# Patient Record
Sex: MALE | URBAN - NONMETROPOLITAN AREA
[De-identification: names, ages, dates, MRNs, and addresses within clinical notes are randomized per-mention and may not be internally consistent; named-entity substitution may affect disease eponyms.]

---

## 2017-08-21 ENCOUNTER — OFFICE VISIT (OUTPATIENT)
Dept: INTERNAL MEDICINE | Age: 32
End: 2017-08-21
Payer: COMMERCIAL

## 2017-08-21 VITALS
HEART RATE: 111 BPM | BODY MASS INDEX: 43.98 KG/M2 | WEIGHT: 224 LBS | RESPIRATION RATE: 9 BRPM | HEIGHT: 60 IN | OXYGEN SATURATION: 98 % | SYSTOLIC BLOOD PRESSURE: 136 MMHG | DIASTOLIC BLOOD PRESSURE: 80 MMHG | TEMPERATURE: 98.4 F

## 2017-08-21 DIAGNOSIS — F32.A DEPRESSION, UNSPECIFIED DEPRESSION TYPE: ICD-10-CM

## 2017-08-21 DIAGNOSIS — E10.319 TYPE 1 DIABETES MELLITUS WITH RETINOPATHY OF BOTH EYES, MACULAR EDEMA PRESENCE UNSPECIFIED, UNSPECIFIED RETINOPATHY SEVERITY (HCC): Chronic | ICD-10-CM

## 2017-08-21 DIAGNOSIS — J01.90 ACUTE NON-RECURRENT SINUSITIS, UNSPECIFIED LOCATION: Primary | ICD-10-CM

## 2017-08-21 PROBLEM — E10.9 DIABETES MELLITUS TYPE 1 (HCC): Chronic | Status: ACTIVE | Noted: 2017-08-21

## 2017-08-21 PROCEDURE — 99213 OFFICE O/P EST LOW 20 MIN: CPT | Performed by: NURSE PRACTITIONER

## 2017-08-21 RX ORDER — SERTRALINE HYDROCHLORIDE 25 MG/1
25 TABLET, FILM COATED ORAL DAILY
Qty: 30 TABLET | Refills: 3 | Status: SHIPPED | OUTPATIENT
Start: 2017-08-21 | End: 2018-07-27

## 2017-08-21 RX ORDER — METHYLPREDNISOLONE 4 MG/1
TABLET ORAL
Qty: 1 KIT | Refills: 0 | Status: SHIPPED | OUTPATIENT
Start: 2017-08-21 | End: 2017-08-27

## 2017-08-21 RX ORDER — AMOXICILLIN AND CLAVULANATE POTASSIUM 875; 125 MG/1; MG/1
1 TABLET, FILM COATED ORAL 2 TIMES DAILY
Qty: 20 TABLET | Refills: 0 | Status: SHIPPED | OUTPATIENT
Start: 2017-08-21 | End: 2017-08-31

## 2017-08-21 ASSESSMENT — ENCOUNTER SYMPTOMS
COLOR CHANGE: 0
BACK PAIN: 0
PHOTOPHOBIA: 0
VOMITING: 0
SINUS PRESSURE: 1
VOICE CHANGE: 0
SHORTNESS OF BREATH: 0
RHINORRHEA: 1
SORE THROAT: 1
NAUSEA: 0
COUGH: 0

## 2017-11-06 NOTE — TELEPHONE ENCOUNTER
Luc Harrison called requesting a refill of the below medication which has been pended for you:     Requested Prescriptions     Pending Prescriptions Disp Refills    insulin detemir (LEVEMIR) 100 UNIT/ML injection vial 1 vial 5     Sig: Inject 35 Units into the skin nightly    insulin NPH (HUMULIN N) 100 UNIT/ML injection vial 1 vial 5     Sig: Inject 15 Units into the skin 2 times daily (before meals)       Last Appointment Date: Visit date not found  Next Appointment Date: 2/22/2018    Allergies   Allergen Reactions    Flagyl [Metronidazole]

## 2017-12-07 DIAGNOSIS — Z00.00 PE (PHYSICAL EXAM), ROUTINE: Primary | ICD-10-CM

## 2018-07-24 ENCOUNTER — TELEPHONE (OUTPATIENT)
Dept: INTERNAL MEDICINE | Age: 33
End: 2018-07-24

## 2018-07-24 DIAGNOSIS — Z00.00 PE (PHYSICAL EXAM), ROUTINE: ICD-10-CM

## 2018-07-24 LAB
ALBUMIN SERPL-MCNC: 4.4 G/DL (ref 3.5–5.2)
ALP BLD-CCNC: 81 U/L (ref 40–130)
ALT SERPL-CCNC: 21 U/L (ref 5–41)
ANION GAP SERPL CALCULATED.3IONS-SCNC: 15 MMOL/L (ref 7–19)
AST SERPL-CCNC: 14 U/L (ref 5–40)
BASOPHILS ABSOLUTE: 0.1 K/UL (ref 0–0.2)
BASOPHILS RELATIVE PERCENT: 0.8 % (ref 0–1)
BILIRUB SERPL-MCNC: <0.2 MG/DL (ref 0.2–1.2)
BUN BLDV-MCNC: 18 MG/DL (ref 6–20)
CALCIUM SERPL-MCNC: 9.6 MG/DL (ref 8.6–10)
CHLORIDE BLD-SCNC: 99 MMOL/L (ref 98–111)
CHOLESTEROL, TOTAL: 181 MG/DL (ref 160–199)
CO2: 26 MMOL/L (ref 22–29)
CREAT SERPL-MCNC: 0.8 MG/DL (ref 0.5–1.2)
CREATININE URINE: 81.9 MG/DL (ref 4.2–622)
EOSINOPHILS ABSOLUTE: 0.2 K/UL (ref 0–0.6)
EOSINOPHILS RELATIVE PERCENT: 3.2 % (ref 0–5)
GFR NON-AFRICAN AMERICAN: >60
GLUCOSE BLD-MCNC: 267 MG/DL (ref 74–109)
HBA1C MFR BLD: 10.3 % (ref 4–6)
HCT VFR BLD CALC: 45.1 % (ref 42–52)
HDLC SERPL-MCNC: 72 MG/DL (ref 55–121)
HEMOGLOBIN: 14.6 G/DL (ref 14–18)
LDL CHOLESTEROL CALCULATED: 86 MG/DL
LYMPHOCYTES ABSOLUTE: 2.8 K/UL (ref 1.1–4.5)
LYMPHOCYTES RELATIVE PERCENT: 42.7 % (ref 20–40)
MCH RBC QN AUTO: 27.4 PG (ref 27–31)
MCHC RBC AUTO-ENTMCNC: 32.4 G/DL (ref 33–37)
MCV RBC AUTO: 84.8 FL (ref 80–94)
MICROALBUMIN UR-MCNC: <1.2 MG/DL (ref 0–19)
MICROALBUMIN/CREAT UR-RTO: NORMAL MG/G
MONOCYTES ABSOLUTE: 0.7 K/UL (ref 0–0.9)
MONOCYTES RELATIVE PERCENT: 10.7 % (ref 0–10)
NEUTROPHILS ABSOLUTE: 2.8 K/UL (ref 1.5–7.5)
NEUTROPHILS RELATIVE PERCENT: 42.3 % (ref 50–65)
PDW BLD-RTO: 12.4 % (ref 11.5–14.5)
PLATELET # BLD: 362 K/UL (ref 130–400)
PMV BLD AUTO: 9.1 FL (ref 9.4–12.4)
POTASSIUM SERPL-SCNC: 4.6 MMOL/L (ref 3.5–5)
RBC # BLD: 5.32 M/UL (ref 4.7–6.1)
SODIUM BLD-SCNC: 140 MMOL/L (ref 136–145)
TOTAL PROTEIN: 7.3 G/DL (ref 6.6–8.7)
TRIGL SERPL-MCNC: 113 MG/DL (ref 0–149)
WBC # BLD: 6.6 K/UL (ref 4.8–10.8)

## 2018-07-24 NOTE — TELEPHONE ENCOUNTER
Patient is requesting refill on his insulin. He has enough for today. Please send to [facility] in [place]. Fior Degroot

## 2018-07-24 NOTE — TELEPHONE ENCOUNTER
Left message to call back, need which insulin and how many units taking and must make appointment to be seen ASAP.

## 2018-07-27 ENCOUNTER — OFFICE VISIT (OUTPATIENT)
Dept: INTERNAL MEDICINE | Age: 33
End: 2018-07-27
Payer: COMMERCIAL

## 2018-07-27 VITALS
BODY MASS INDEX: 35.99 KG/M2 | HEART RATE: 84 BPM | HEIGHT: 69 IN | SYSTOLIC BLOOD PRESSURE: 136 MMHG | DIASTOLIC BLOOD PRESSURE: 78 MMHG | OXYGEN SATURATION: 98 % | WEIGHT: 243 LBS

## 2018-07-27 DIAGNOSIS — E10.319 TYPE 1 DIABETES MELLITUS WITH RETINOPATHY OF BOTH EYES, MACULAR EDEMA PRESENCE UNSPECIFIED, UNSPECIFIED RETINOPATHY SEVERITY (HCC): Primary | Chronic | ICD-10-CM

## 2018-07-27 DIAGNOSIS — L73.8 BACTERIAL FOLLICULITIS: ICD-10-CM

## 2018-07-27 DIAGNOSIS — E78.00 HYPERCHOLESTEREMIA: ICD-10-CM

## 2018-07-27 PROCEDURE — 99215 OFFICE O/P EST HI 40 MIN: CPT | Performed by: INTERNAL MEDICINE

## 2018-07-27 RX ORDER — DOXYCYCLINE 100 MG/1
100 TABLET ORAL 2 TIMES DAILY
Qty: 20 TABLET | Refills: 0 | Status: SHIPPED | OUTPATIENT
Start: 2018-07-27 | End: 2018-08-06

## 2018-07-27 ASSESSMENT — ENCOUNTER SYMPTOMS
VOMITING: 0
BLOOD IN STOOL: 0
SINUS PRESSURE: 0
BACK PAIN: 0
EYE DISCHARGE: 0
SORE THROAT: 0
ABDOMINAL DISTENTION: 0
TROUBLE SWALLOWING: 0
NAUSEA: 0
WHEEZING: 0
EYE ITCHING: 0
SHORTNESS OF BREATH: 0
ABDOMINAL PAIN: 0

## 2018-08-24 ENCOUNTER — TELEPHONE (OUTPATIENT)
Dept: INTERNAL MEDICINE | Age: 33
End: 2018-08-24

## 2018-08-24 RX ORDER — GLUCOSAMINE HCL/CHONDROITIN SU 500-400 MG
CAPSULE ORAL
Qty: 200 STRIP | Refills: 2 | Status: SHIPPED | OUTPATIENT
Start: 2018-08-24

## 2018-10-25 ENCOUNTER — OFFICE VISIT (OUTPATIENT)
Dept: INTERNAL MEDICINE | Age: 33
End: 2018-10-25
Payer: COMMERCIAL

## 2018-10-25 VITALS
HEART RATE: 88 BPM | HEIGHT: 69 IN | SYSTOLIC BLOOD PRESSURE: 124 MMHG | OXYGEN SATURATION: 98 % | DIASTOLIC BLOOD PRESSURE: 80 MMHG | WEIGHT: 256 LBS | TEMPERATURE: 97.9 F | BODY MASS INDEX: 37.92 KG/M2

## 2018-10-25 DIAGNOSIS — L73.2 HIDRADENITIS SUPPURATIVA OF RIGHT AXILLA: Primary | ICD-10-CM

## 2018-10-25 DIAGNOSIS — L02.421 FURUNCLE OF RIGHT AXILLA: ICD-10-CM

## 2018-10-25 PROCEDURE — 99213 OFFICE O/P EST LOW 20 MIN: CPT | Performed by: NURSE PRACTITIONER

## 2018-10-25 PROCEDURE — 10060 I&D ABSCESS SIMPLE/SINGLE: CPT | Performed by: NURSE PRACTITIONER

## 2018-10-25 RX ORDER — CEPHALEXIN 500 MG/1
500 CAPSULE ORAL 3 TIMES DAILY
Qty: 30 CAPSULE | Refills: 0 | Status: SHIPPED | OUTPATIENT
Start: 2018-10-25 | End: 2019-07-18

## 2018-10-25 ASSESSMENT — ENCOUNTER SYMPTOMS
SHORTNESS OF BREATH: 0
RHINORRHEA: 0
VOICE CHANGE: 0
BACK PAIN: 0
COUGH: 0
NAUSEA: 0
PHOTOPHOBIA: 0
COLOR CHANGE: 0
VOMITING: 0

## 2018-10-25 ASSESSMENT — PATIENT HEALTH QUESTIONNAIRE - PHQ9
2. FEELING DOWN, DEPRESSED OR HOPELESS: 0
1. LITTLE INTEREST OR PLEASURE IN DOING THINGS: 0
SUM OF ALL RESPONSES TO PHQ QUESTIONS 1-9: 0
SUM OF ALL RESPONSES TO PHQ QUESTIONS 1-9: 0
SUM OF ALL RESPONSES TO PHQ9 QUESTIONS 1 & 2: 0

## 2018-10-25 NOTE — PROGRESS NOTES
Constitutional: He is oriented to person, place, and time. He appears well-developed and well-nourished. HENT:   Head: Atraumatic. Right Ear: External ear normal.   Left Ear: External ear normal.   Nose: Nose normal.   Mouth/Throat: Oropharynx is clear and moist.   Eyes: Pupils are equal, round, and reactive to light. Conjunctivae are normal.   Neck: Normal range of motion. Neck supple. Cardiovascular: Normal rate, regular rhythm, S1 normal, S2 normal and normal heart sounds. Pulmonary/Chest: Effort normal and breath sounds normal.   Abdominal: Soft. Bowel sounds are normal.   Musculoskeletal: Normal range of motion. Neurological: He is alert and oriented to person, place, and time. Skin: Skin is warm and dry. Right axilla: 3-4 enlarged/raised lesions; able to drain abscess laterally with moderate amount of drainage from area. Dressing applied. Psychiatric: He has a normal mood and affect. His behavior is normal.   Nursing note and vitals reviewed. /80 (Site: Left Upper Arm, Position: Sitting)   Pulse 88   Temp 97.9 °F (36.6 °C)   Ht 5' 9\" (1.753 m)   Wt 256 lb (116.1 kg)   SpO2 98%   BMI 37.80 kg/m²     Assessment:       Diagnosis Orders   1. Hidradenitis suppurativa of right axilla     2. Furuncle of right axilla         Plan:     Area was cleaned and prepped. Using #11 blade the abcess was opened and moderate amount of purulent drainage obtained. Minimal blood loss. Patient tolerated well. Keflex TID for 10 days; keep area clean and dry. Warm compresses to affected area. Call if symptoms worsen or fail to improve. Patient given educational materials -see patient instructions. Discussed use, benefit, and side effects of prescribed medications. All patient questions answered. Pt voiced understanding. Reviewed health maintenance. Instructed to continue currentmedications, diet and exercise. Patient agreed with treatment plan. Follow up as directed.

## 2018-11-05 ENCOUNTER — TELEPHONE (OUTPATIENT)
Dept: INTERNAL MEDICINE | Age: 33
End: 2018-11-05

## 2018-11-06 ENCOUNTER — TELEPHONE (OUTPATIENT)
Dept: INTERNAL MEDICINE | Age: 33
End: 2018-11-06

## 2018-11-06 PROBLEM — J32.9 SINUSITIS: Status: ACTIVE | Noted: 2018-11-06

## 2018-11-06 PROBLEM — R05.9 COUGH: Status: ACTIVE | Noted: 2018-11-06

## 2018-11-06 PROBLEM — F32.A DEPRESSIVE DISORDER: Status: ACTIVE | Noted: 2018-11-06

## 2019-07-18 ENCOUNTER — OFFICE VISIT (OUTPATIENT)
Dept: FAMILY MEDICINE CLINIC | Age: 34
End: 2019-07-18
Payer: COMMERCIAL

## 2019-07-18 VITALS
BODY MASS INDEX: 39.25 KG/M2 | HEIGHT: 69 IN | OXYGEN SATURATION: 99 % | DIASTOLIC BLOOD PRESSURE: 84 MMHG | RESPIRATION RATE: 20 BRPM | HEART RATE: 78 BPM | WEIGHT: 265 LBS | SYSTOLIC BLOOD PRESSURE: 132 MMHG | TEMPERATURE: 98 F

## 2019-07-18 DIAGNOSIS — E10.3593 TYPE 1 DIABETES MELLITUS WITH PROLIFERATIVE RETINOPATHY OF BOTH EYES, MACULAR EDEMA PRESENCE UNSPECIFIED, UNSPECIFIED PROLIFERATIVE RETINOPATHY TYPE (HCC): Primary | Chronic | ICD-10-CM

## 2019-07-18 PROCEDURE — 99203 OFFICE O/P NEW LOW 30 MIN: CPT | Performed by: NURSE PRACTITIONER

## 2019-07-18 ASSESSMENT — ENCOUNTER SYMPTOMS
VOMITING: 0
SINUS PRESSURE: 0
ABDOMINAL PAIN: 0
SHORTNESS OF BREATH: 0
SINUS PAIN: 0
NAUSEA: 0

## 2019-07-18 NOTE — PROGRESS NOTES
DELICA LANCETS FINE MISC ONE LANCET PER TEST UP TO 6 TIMES A DAY DX:E10.31 Yes Mercedez Simmons MD   Insulin Pen Needle 32G X 4 MM MISC USE AS DIRECTED Yes Mercedez Simmons MD   blood glucose monitor strips TESTING 6 TIMES PER DAY DX:E10.31 Yes Mercedez Simmons MD     Allergies   Allergen Reactions    Flagyl [Metronidazole]      Past Surgical History:   Procedure Laterality Date    PILONIDAL CYST EXCISION      TONSILLECTOMY       Family History   Problem Relation Age of Onset    No Known Problems Mother     Colon Cancer Father     Cancer Maternal Grandfather     Heart Attack Maternal Grandfather     Cancer Paternal Grandfather     Heart Attack Paternal Grandfather      Social History     Socioeconomic History    Marital status: Unknown     Spouse name: Not on file    Number of children: Not on file    Years of education: Not on file    Highest education level: Not on file   Occupational History    Not on file   Social Needs    Financial resource strain: Not on file    Food insecurity:     Worry: Not on file     Inability: Not on file    Transportation needs:     Medical: Not on file     Non-medical: Not on file   Tobacco Use    Smoking status: Never Smoker    Smokeless tobacco: Never Used   Substance and Sexual Activity    Alcohol use: No    Drug use: No    Sexual activity: Not on file   Lifestyle    Physical activity:     Days per week: Not on file     Minutes per session: Not on file    Stress: Not on file   Relationships    Social connections:     Talks on phone: Not on file     Gets together: Not on file     Attends [employer] service: Not on file     Active member of club or organization: Not on file     Attends meetings of clubs or organizations: Not on file     Relationship status: Not on file    Intimate partner violence:     Fear of current or ex partner: Not on file     Emotionally abused: Not on file     Physically abused: Not on file     Forced sexual activity: Not on file Creatinine Urine Ratio     TSH without Reflex       PLAN:    Argelia Barnett: New Patient (establish care for diabetes .)      Diagnosis and orders for this visit are above. Return to clinic in 4 months with labs.

## 2019-11-07 ENCOUNTER — TELEPHONE (OUTPATIENT)
Dept: FAMILY MEDICINE CLINIC | Age: 34
End: 2019-11-07

## 2020-05-11 RX ORDER — INSULIN DEGLUDEC 200 U/ML
INJECTION, SOLUTION SUBCUTANEOUS
Qty: 18 ML | Refills: 0 | OUTPATIENT
Start: 2020-05-11

## 2020-05-15 ENCOUNTER — TELEPHONE (OUTPATIENT)
Dept: ADMINISTRATIVE | Age: 35
End: 2020-05-15

## 2020-05-21 ENCOUNTER — OFFICE VISIT (OUTPATIENT)
Dept: FAMILY MEDICINE CLINIC | Age: 35
End: 2020-05-21
Payer: COMMERCIAL

## 2020-05-21 VITALS
SYSTOLIC BLOOD PRESSURE: 118 MMHG | TEMPERATURE: 97.4 F | HEART RATE: 104 BPM | OXYGEN SATURATION: 98 % | HEIGHT: 69 IN | RESPIRATION RATE: 20 BRPM | BODY MASS INDEX: 38.51 KG/M2 | WEIGHT: 260 LBS | DIASTOLIC BLOOD PRESSURE: 72 MMHG

## 2020-05-21 PROCEDURE — 99214 OFFICE O/P EST MOD 30 MIN: CPT | Performed by: FAMILY MEDICINE

## 2020-05-21 RX ORDER — INSULIN DEGLUDEC 200 U/ML
65 INJECTION, SOLUTION SUBCUTANEOUS NIGHTLY
Qty: 6 PEN | Refills: 5 | Status: SHIPPED | OUTPATIENT
Start: 2020-05-21 | End: 2020-10-20 | Stop reason: SDUPTHER

## 2020-05-21 RX ORDER — NAFTIFINE HYDROCHLORIDE 20 MG/G
CREAM TOPICAL
Qty: 1 TUBE | Refills: 2 | Status: SHIPPED | OUTPATIENT
Start: 2020-05-21 | End: 2021-12-20 | Stop reason: ALTCHOICE

## 2020-05-21 RX ORDER — DOXYCYCLINE HYCLATE 100 MG/1
100 CAPSULE ORAL 2 TIMES DAILY
Qty: 20 CAPSULE | Refills: 0 | Status: SHIPPED | OUTPATIENT
Start: 2020-05-21 | End: 2020-05-31

## 2020-05-21 RX ORDER — INSULIN LISPRO 100 [IU]/ML
INJECTION, SOLUTION INTRAVENOUS; SUBCUTANEOUS
Qty: 8 PEN | Refills: 5 | Status: SHIPPED | OUTPATIENT
Start: 2020-05-21 | End: 2020-10-20 | Stop reason: SDUPTHER

## 2020-05-21 ASSESSMENT — ENCOUNTER SYMPTOMS
VOMITING: 0
WHEEZING: 0
SHORTNESS OF BREATH: 0
NAUSEA: 0
ABDOMINAL PAIN: 0
EYE PAIN: 0
RHINORRHEA: 0

## 2020-05-21 NOTE — PROGRESS NOTES
Juany Montez is a 27 y.o. male who presents today for   Chief Complaint   Patient presents with    Medication Refill     Patient presents for medication refills.  Discuss Medications     Patient states that he needs 4 pens of Tresiba per month. Patient also needs pen needles. Chief Complaint: Diabetes    HPI  Patient reports that he has a history of type 1 diabetes since he was 7years old. He states that he has had reasonable control over his diabetes with his blood sugars running mostly in the upper 100s up to about 250. He states that he believes he is has good improvement with use of Wisam Cea and is managing his Humalog via carb counting and adjustments of his blood sugar. He states that this seems to be doing reasonably well for him but he had previously been looking into pursuing a continuous glucose meter but has not had that set up as of yet. He states that years ago he went through the process with a diabetic educator with intensive getting a continuous glucose meter but after 7 visits he was then told that he could not afford the glucose meter and for that reason he had dropped it at that point but discussed with his PCP at his last visit and the plan was to get some labs to see if they get him to qualify but he never followed through with labs but states that he is to the point now where he has to be more diligent and got the labs this morning at [facility] and is hopeful that he may be able to pursue a continuous glucose meter moving forward if he is still uncontrolled. He denies any issues with use of his medications and reports that he is not having any new symptoms. He does have a history of proliferative retinopathy and is following with ophthalmology and receiving treatment.     He does state that he has had a infection or rash that is been occurring on his chest and is been present for some time now and is just not getting any better and is interested in seeing if there is something that may build to improve the area. He states that it will itch at times but otherwise denies any symptoms from the involved area. He does also report that he has a fungal rash in his groin area. He states that he has had this a number times before and previously has been able to get it to calm down with the use of some over-the-counter antifungals or some prescription antifungal creams. He did  some over-the-counter antifungal cream that he may have gotten a little bit of improvement out of but is not resolving the area. Review of Systems   Constitutional: Negative for activity change, appetite change, fever and unexpected weight change. HENT: Negative for congestion and rhinorrhea. Eyes: Negative for pain and visual disturbance. Respiratory: Negative for shortness of breath and wheezing. Cardiovascular: Negative for chest pain and palpitations. Gastrointestinal: Negative for abdominal pain, nausea and vomiting. Genitourinary: Negative for difficulty urinating and frequency. Musculoskeletal: Negative for arthralgias and myalgias. Skin: Positive for rash. Negative for wound. Neurological: Negative for dizziness and headaches. Hematological: Negative for adenopathy. Does not bruise/bleed easily. Psychiatric/Behavioral: Negative for agitation and sleep disturbance. The patient is not nervous/anxious.         Past Medical History:   Diagnosis Date    Diabetes mellitus type 1 ([address]     Diabetic retinopathy [facility]     RIGHT EYE    Diabetic retinopathy ([address]        Current Outpatient Medications   Medication Sig Dispense Refill    Insulin Pen Needle 32G X 4 MM MISC USE AS DIRECTED - pen needles for Tresiba 200 each 5    Insulin Degludec (TRESIBA FLEXTOUCH) 200 UNIT/ML SOPN Inject 65 Units into the skin nightly 6 pen 5    insulin lispro, 1 Unit Dial, (HUMALOG KWIKPEN) 100 UNIT/ML SOPN 1 unit per 6 carbs at meals 8 pen 5    Naftifine HCl 2 % CREA Musculoskeletal: Normal range of motion and neck supple. No muscular tenderness. Trachea: No tracheal deviation. Cardiovascular:      Rate and Rhythm: Normal rate and regular rhythm. Heart sounds: Normal heart sounds. No murmur. No friction rub. No gallop. Pulmonary:      Effort: Pulmonary effort is normal. No respiratory distress. Breath sounds: Normal breath sounds. No wheezing or rales. Abdominal:      General: Bowel sounds are normal. There is no distension. Palpations: Abdomen is soft. Tenderness: There is no abdominal tenderness. Musculoskeletal: Normal range of motion. General: No deformity (No gross deformities of upper or lower extremities) or signs of injury. Right lower leg: No edema. Left lower leg: No edema. Lymphadenopathy:      Cervical: No cervical adenopathy. Skin:     General: Skin is warm and dry. Findings: Rash present. No erythema. Comments: Patient has area on the chest which has the appearance of folliculitis   Neurological:      Mental Status: He is alert and oriented to person, place, and time. Cranial Nerves: No cranial nerve deficit. Psychiatric:         Behavior: Behavior normal.         Thought Content: Thought content normal.         Assessment:       ICD-10-CM    1. Type 1 diabetes mellitus with proliferative retinopathy of both eyes, macular edema presence unspecified, unspecified proliferative retinopathy type ([facility]) B63.8872 Insulin Pen Needle 32G X 4 MM MISC     Insulin Degludec (TRESIBA FLEXTOUCH) 200 UNIT/ML SOPN     insulin lispro, 1 Unit Dial, (HUMALOG KWIKPEN) 100 UNIT/ML SOPN   2. Yeast infection of the skin B37.2 Naftifine HCl 2 % CREA   3. Folliculitis C68.5 doxycycline hyclate (VIBRAMYCIN) 100 MG capsule       Plan:  Discussed diagnoses, expected course, and proper use of medication. Discussed proper care of involved areas and potential side effects from the medication.   Stressed the importance of maintaining his follow-up with a specialist and the importance of being diligent with his diet and diabetes medications. Discussed signs and symptoms requiring medical attention. All questions were answered and patient voiced understanding and agreement with plan as discussed. No orders of the defined types were placed in this encounter. Orders Placed This Encounter   Medications    Insulin Pen Needle 32G X 4 MM MISC     Sig: USE AS DIRECTED - pen needles for Tresiba     Dispense:  200 each     Refill:  5    Insulin Degludec (TRESIBA FLEXTOUCH) 200 UNIT/ML SOPN     Sig: Inject 65 Units into the skin nightly     Dispense:  6 pen     Refill:  5     Please include any needed needles    insulin lispro, 1 Unit Dial, (HUMALOG KWIKPEN) 100 UNIT/ML SOPN     Si unit per 6 carbs at meals     Dispense:  8 pen     Refill:  5     Please include any needed needles.  Naftifine HCl 2 % CREA     Sig: Apply to involved area daily     Dispense:  1 Tube     Refill:  2    doxycycline hyclate (VIBRAMYCIN) 100 MG capsule     Sig: Take 1 capsule by mouth 2 times daily for 10 days     Dispense:  20 capsule     Refill:  0     Medications Discontinued During This Encounter   Medication Reason    Insulin Pen Needle 32G X 4 MM MISC REORDER    Insulin Degludec (TRESIBA FLEXTOUCH) 200 UNIT/ML SOPN REORDER     Patient Instructions   Patient given educational handouts and has had all questions answered. Patient voices understanding and agrees to plans along with risks and benefits of plan. Patient is instructed to continue prior meds,diet, and exercise plans as instructed. Patient agrees to follow up as instructed and sooner if needed. Patient agrees to go to ER if condition becomes emergent. Return if symptoms worsen or fail to improve, for next scheduled follow up with PCP.

## 2020-06-22 ENCOUNTER — NURSE TRIAGE (OUTPATIENT)
Dept: OTHER | Facility: CLINIC | Age: 35
End: 2020-06-22

## 2020-06-24 ENCOUNTER — OFFICE VISIT (OUTPATIENT)
Dept: FAMILY MEDICINE CLINIC | Age: 35
End: 2020-06-24
Payer: COMMERCIAL

## 2020-06-24 VITALS
DIASTOLIC BLOOD PRESSURE: 80 MMHG | SYSTOLIC BLOOD PRESSURE: 122 MMHG | HEIGHT: 69 IN | OXYGEN SATURATION: 97 % | BODY MASS INDEX: 38.66 KG/M2 | TEMPERATURE: 97.1 F | WEIGHT: 261 LBS | HEART RATE: 88 BPM

## 2020-06-24 PROCEDURE — 99213 OFFICE O/P EST LOW 20 MIN: CPT | Performed by: FAMILY MEDICINE

## 2020-06-24 RX ORDER — BLOOD-GLUCOSE TRANSMITTER
EACH MISCELLANEOUS
Qty: 1 EACH | Refills: 5 | Status: SHIPPED | OUTPATIENT
Start: 2020-06-24 | End: 2020-10-20

## 2020-06-24 RX ORDER — BLOOD-GLUCOSE SENSOR
EACH MISCELLANEOUS
Qty: 9 EACH | Refills: 3 | Status: SHIPPED | OUTPATIENT
Start: 2020-06-24 | End: 2020-10-20

## 2020-06-24 RX ORDER — BLOOD-GLUCOSE,RECEIVER,CONT
EACH MISCELLANEOUS
Qty: 1 DEVICE | Refills: 0 | Status: SHIPPED | OUTPATIENT
Start: 2020-06-24 | End: 2020-10-20

## 2020-06-24 ASSESSMENT — ENCOUNTER SYMPTOMS
EYE PAIN: 0
SHORTNESS OF BREATH: 0
NAUSEA: 0
RHINORRHEA: 0
ABDOMINAL PAIN: 0
VOMITING: 0
WHEEZING: 0

## 2020-06-24 NOTE — PROGRESS NOTES
arthralgias and myalgias. Skin: Positive for wound. Negative for rash. Neurological: Negative for dizziness and headaches. Hematological: Negative for adenopathy. Does not bruise/bleed easily. Psychiatric/Behavioral: Negative for agitation and sleep disturbance. The patient is not nervous/anxious. Past Medical History:   Diagnosis Date    Diabetes mellitus type 1 ([address]     Diabetic retinopathy ([address]     RIGHT EYE    Diabetic retinopathy ([address]        Current Outpatient Medications   Medication Sig Dispense Refill    silver sulfADIAZINE (SILVADENE) 1 % cream Apply topically daily. 50 g 1    Continuous Blood Gluc  (DEXCOM G6 ) SAM Use to monitor blood sugar continuously. 1 Device 0    Continuous Blood Gluc Sensor (DEXCOM G6 SENSOR) MISC Use to continuously monitor blood sugar every 10 days. 9 each 3    Continuous Blood Gluc Transmit (DEXCOM G6 TRANSMITTER) MISC You to continuously monitor blood sugar. 1 each 5    Insulin Pen Needle 32G X 4 MM MISC USE AS DIRECTED - pen needles for Tresiba 200 each 5    Insulin Degludec (TRESIBA FLEXTOUCH) 200 UNIT/ML SOPN Inject 65 Units into the skin nightly 6 pen 5    insulin lispro, 1 Unit Dial, (HUMALOG KWIKPEN) 100 UNIT/ML SOPN 1 unit per 6 carbs at meals 8 pen 5    Naftifine HCl 2 % CREA Apply to involved area daily 1 Tube 2    insulin lispro (HUMALOG KWIKPEN) 100 UNIT/ML pen Indications: 1 unit per 6 carbs at meals USE AS DIRECTED 8 pen 5    ONETOUCH DELICA LANCETS FINE MISC ONE LANCET PER TEST UP TO 6 TIMES A DAY DX:E10.31 1 each 2    blood glucose monitor strips TESTING 6 TIMES PER DAY DX:E10.31 200 strip 2     No current facility-administered medications for this visit.            Allergies   Allergen Reactions    Flagyl [Metronidazole]     Other      Pine Sol -  causes rash       Past Surgical History:   Procedure Laterality Date    PILONIDAL CYST EXCISION      TONSILLECTOMY         Social History     Tobacco Use    Lymphadenopathy:      Cervical: No cervical adenopathy. Skin:     General: Skin is warm and dry. Findings: No erythema or rash. Comments: On the inside of patient's right ankle there is an approximately 2 and half to 3 cm x 1 cm area that  resembles more of an abrasion or burn. It appears that with the top layer skin is gone but has started to scab over and healing up without any issues. No surrounding erythema or warmth appreciated. Neurological:      Mental Status: He is alert and oriented to person, place, and time. Cranial Nerves: No cranial nerve deficit. Psychiatric:         Behavior: Behavior normal.         Thought Content: Thought content normal.         Assessment:       ICD-10-CM    1. Skin sore L98.9 silver sulfADIAZINE (SILVADENE) 1 % cream   2. Type 1 diabetes mellitus with hyperglycemia (HCC) E10.65 Continuous Blood Gluc  (DEXCOM G6 ) SAM     Continuous Blood Gluc Sensor (DEXCOM G6 SENSOR) MISC     Continuous Blood Gluc Transmit (DEXCOM G6 TRANSMITTER) MISC       Plan:  Discussed potential causes of the skin sore, expected course, and proper use of medication, including already available home medications and OTC medications. Discussed proper care of involved area. Discussed proper foot care. Discussed signs and symptoms requiring medical attention. At this time will attempt to pursue getting a Dexcom unit for continuous glucose monitoring to attempt to achieve better control of his diabetes. All questions were answered and patient voiced understanding and agreement with plan as discussed. No orders of the defined types were placed in this encounter. Orders Placed This Encounter   Medications    silver sulfADIAZINE (SILVADENE) 1 % cream     Sig: Apply topically daily. Dispense:  50 g     Refill:  1    Continuous Blood Gluc  (DEXCOM G6 ) SAM     Sig: Use to monitor blood sugar continuously.      Dispense:  1 Device     Refill:

## 2020-09-25 ENCOUNTER — TELEPHONE (OUTPATIENT)
Dept: FAMILY MEDICINE CLINIC | Age: 35
End: 2020-09-25

## 2020-09-25 NOTE — TELEPHONE ENCOUNTER
Patient's wife called stating their insurance has changed and he needs a PA for his insulin. Has tried Lantus before and that is what Giovanna Georges says he needs to try.      Told wife I will let her know when I hear from the insurance company

## 2020-10-20 ENCOUNTER — OFFICE VISIT (OUTPATIENT)
Dept: FAMILY MEDICINE CLINIC | Age: 35
End: 2020-10-20
Payer: COMMERCIAL

## 2020-10-20 VITALS
TEMPERATURE: 97.3 F | WEIGHT: 259 LBS | SYSTOLIC BLOOD PRESSURE: 138 MMHG | DIASTOLIC BLOOD PRESSURE: 88 MMHG | HEART RATE: 81 BPM | RESPIRATION RATE: 20 BRPM | BODY MASS INDEX: 38.25 KG/M2 | OXYGEN SATURATION: 98 %

## 2020-10-20 PROCEDURE — 99214 OFFICE O/P EST MOD 30 MIN: CPT | Performed by: NURSE PRACTITIONER

## 2020-10-20 RX ORDER — INSULIN DEGLUDEC 200 U/ML
65 INJECTION, SOLUTION SUBCUTANEOUS NIGHTLY
Qty: 6 PEN | Refills: 5 | Status: SHIPPED | OUTPATIENT
Start: 2020-10-20 | End: 2021-12-20 | Stop reason: SDUPTHER

## 2020-10-20 RX ORDER — INSULIN LISPRO 100 [IU]/ML
INJECTION, SOLUTION INTRAVENOUS; SUBCUTANEOUS
Qty: 8 PEN | Refills: 5 | Status: SHIPPED | OUTPATIENT
Start: 2020-10-20 | End: 2021-12-20 | Stop reason: SDUPTHER

## 2020-10-20 RX ORDER — MINOCYCLINE HYDROCHLORIDE 100 MG/1
100 TABLET ORAL 2 TIMES DAILY
Qty: 60 TABLET | Refills: 0 | Status: SHIPPED | OUTPATIENT
Start: 2020-10-20 | End: 2020-11-19

## 2020-10-20 RX ORDER — INSULIN LISPRO 100 [IU]/ML
INJECTION, SOLUTION INTRAVENOUS; SUBCUTANEOUS
Qty: 8 PEN | Refills: 5 | Status: SHIPPED | OUTPATIENT
Start: 2020-10-20 | End: 2020-10-20 | Stop reason: SDUPTHER

## 2020-10-20 ASSESSMENT — ENCOUNTER SYMPTOMS
DIARRHEA: 0
TROUBLE SWALLOWING: 0
ABDOMINAL PAIN: 0
NAUSEA: 0
CONSTIPATION: 0
COUGH: 0
SHORTNESS OF BREATH: 0
VOMITING: 0

## 2020-10-20 NOTE — PROGRESS NOTES
retinopathy ([address]     RIGHT EYE    Diabetic retinopathy ([address]       Prior to Visit Medications    Medication Sig Taking? Authorizing Provider   Continuous Blood Gluc  (FREESTYLE OMI 2 READER SYSTM) SAM 1 Device by Does not apply route daily Yes SADIE Avila   Continuous Blood Gluc Sensor (FREESTYLE OMI 2 SENSOR SYSTM) MISC 1 patch by Does not apply route daily Yes SADIE Avila   Insulin Pen Needle 32G X 4 MM MISC USE AS DIRECTED - pen needles for Tresiba Yes SADIE Avila   Insulin Degludec (TRESIBA FLEXTOUCH) 200 UNIT/ML SOPN Inject 65 Units into the skin nightly Yes SADIE Avila   insulin lispro, 1 Unit Dial, (HUMALOG KWIKPEN) 100 UNIT/ML SOPN 1 unit per 6 carbs at meals Yes SADIE Avila   minocycline (DYNACIN) 100 MG tablet Take 1 tablet by mouth 2 times daily Yes SADIE Avila   Naftifine HCl 2 % CREA Apply to involved area daily Yes Viji Gillespie MD   insulin lispro (HUMALOG KWIKPEN) 100 UNIT/ML pen Indications: 1 unit per 6 carbs at meals USE AS DIRECTED Yes SADIE Avila   ONETOUCH DELICA LANCETS FINE MISC ONE LANCET PER TEST UP TO 6 TIMES A DAY DX:E10.31 Yes Cassie Marquez MD   blood glucose monitor strips TESTING 6 TIMES PER DAY DX:E10.31 Yes Cassie Marquez MD     Allergies   Allergen Reactions    Flagyl [Metronidazole]     Other      Pine Sol -  causes rash       Review of Systems   Constitutional: Negative for fatigue, fever and unexpected weight change. HENT: Negative for hearing loss and trouble swallowing. Eyes: Negative for visual disturbance. Respiratory: Negative for cough and shortness of breath. Cardiovascular: Negative for chest pain and leg swelling. Gastrointestinal: Negative for abdominal pain, constipation, diarrhea, nausea and vomiting. Genitourinary: Negative for difficulty urinating and dysuria. Musculoskeletal: Positive for arthralgias (shoulder). Negative for myalgias. Neurological: Negative for dizziness and headaches. Psychiatric/Behavioral: Positive for sleep disturbance (night and days mixed up). Negative for dysphoric mood. OBJECTIVE:    Physical Exam  Vitals signs reviewed. Constitutional:       Appearance: Normal appearance. He is well-developed. HENT:      Head: Normocephalic and atraumatic. Right Ear: Tympanic membrane, ear canal and external ear normal. There is no impacted cerumen. Left Ear: Tympanic membrane, ear canal and external ear normal. There is no impacted cerumen. Nose: Nose normal.      Mouth/Throat:      Lips: Pink. Mouth: Mucous membranes are moist.      Dentition: Normal dentition. Tongue: No lesions. Pharynx: Oropharynx is clear. Uvula midline. Tonsils: No tonsillar exudate or tonsillar abscesses. Eyes:      General: Lids are normal.         Right eye: No discharge. Left eye: No discharge. Extraocular Movements:      Right eye: Normal extraocular motion. Left eye: Normal extraocular motion. Conjunctiva/sclera: Conjunctivae normal.      Right eye: Right conjunctiva is not injected. Left eye: Left conjunctiva is not injected. Pupils: Pupils are equal, round, and reactive to light. Neck:      Musculoskeletal: Normal range of motion and neck supple. Thyroid: No thyromegaly. Vascular: No carotid bruit or JVD. Cardiovascular:      Rate and Rhythm: Normal rate and regular rhythm. Pulses:           Carotid pulses are 2+ on the right side and 2+ on the left side. Radial pulses are 2+ on the right side and 2+ on the left side. Heart sounds: Normal heart sounds, S1 normal and S2 normal. No murmur. Pulmonary:      Effort: Pulmonary effort is normal. No accessory muscle usage. Breath sounds: Normal breath sounds. Abdominal:      General: Bowel sounds are normal. There is no distension or abdominal bruit. Palpations: Abdomen is soft.  There is no mass. Tenderness: There is no abdominal tenderness. Hernia: No hernia is present. Musculoskeletal: Normal range of motion. Right lower leg: No edema. Left lower leg: No edema. Lymphadenopathy:      Cervical:      Right cervical: No superficial cervical adenopathy. Left cervical: No superficial cervical adenopathy. Skin:     General: Skin is warm and dry. Coloration: Skin is not jaundiced or pale. Findings: Erythema and rash present. No lesion. Rash is crusting and papular. Nails: There is no clubbing. Neurological:      Mental Status: He is alert and oriented to person, place, and time. Cranial Nerves: No facial asymmetry. Motor: No weakness or tremor. Coordination: Coordination normal.      Gait: Gait normal.      Deep Tendon Reflexes: Reflexes are normal and symmetric. Psychiatric:         Attention and Perception: Attention normal.         Mood and Affect: Mood normal.         Speech: Speech normal.         Behavior: Behavior normal.         Thought Content: Thought content normal.         Cognition and Memory: Memory normal.         Judgment: Judgment normal.        /88 (Site: Left Upper Arm, Position: Sitting, Cuff Size: Medium Adult)   Pulse 81   Temp 97.3 °F (36.3 °C) (Temporal)   Resp 20   Wt 259 lb (117.5 kg)   SpO2 98%   BMI 38.25 kg/m²      ASSESSMENT:      ICD-10-CM    1.  Type 1 diabetes mellitus with proliferative retinopathy of both eyes, macular edema presence unspecified, unspecified proliferative retinopathy type (HCC)  E05.5179 Continuous Blood Gluc  (FREESTYLE OMI 2 READER SYSTM) SAM     Continuous Blood Gluc Sensor (FREESTYLE OMI 2 SENSOR SYSTM) MISC     Insulin Pen Needle 32G X 4 MM MISC     Insulin Degludec (TRESIBA FLEXTOUCH) 200 UNIT/ML SOPN     insulin lispro, 1 Unit Dial, (HUMALOG KWIKPEN) 100 UNIT/ML SOPN     CBC Auto Differential     Comprehensive Metabolic Panel     Hemoglobin A1C Microalbumin / Creatinine Urine Ratio     T4, Free     TSH without Reflex   2. Hypercholesteremia  E78.00 Lipid Panel     T4, Free     TSH without Reflex   3. Hypothyroidism, unspecified type  E03.9    4. Skin infection  L08.9 minocycline (DYNACIN) 100 MG tablet       PLAN:    Samuel Barnett: Diabetes (check up for diabetic . needs labs )    Review labs  I think compliance would be better if we are able to achieve the continuous monitor and he can adjust his insulin according to how high his blood sugars hopefully the Yajaira Rebolledo will not be that expensive and we can adjust accordingly  Diagnosis and orders for thisvisit are above. All patient questions answered. Pt voiced understanding. Reviewedhealth maintenance. Instructed to continue current medications, diet and exercise. Patient agreed with treatment plan. Follow up as directed.

## 2021-01-21 ENCOUNTER — OFFICE VISIT (OUTPATIENT)
Dept: FAMILY MEDICINE CLINIC | Age: 36
End: 2021-01-21
Payer: COMMERCIAL

## 2021-01-21 VITALS
HEART RATE: 85 BPM | TEMPERATURE: 96.6 F | BODY MASS INDEX: 39.25 KG/M2 | OXYGEN SATURATION: 99 % | HEIGHT: 69 IN | WEIGHT: 265 LBS | DIASTOLIC BLOOD PRESSURE: 80 MMHG | SYSTOLIC BLOOD PRESSURE: 130 MMHG

## 2021-01-21 DIAGNOSIS — B37.2 YEAST INFECTION OF THE SKIN: Primary | ICD-10-CM

## 2021-01-21 PROCEDURE — 99213 OFFICE O/P EST LOW 20 MIN: CPT | Performed by: FAMILY MEDICINE

## 2021-01-21 RX ORDER — KETOCONAZOLE 20 MG/G
CREAM TOPICAL
Qty: 30 G | Refills: 1 | Status: SHIPPED | OUTPATIENT
Start: 2021-01-21 | End: 2021-12-20 | Stop reason: ALTCHOICE

## 2021-01-21 RX ORDER — FLUCONAZOLE 200 MG/1
200 TABLET ORAL
Qty: 2 TABLET | Refills: 0 | Status: SHIPPED | OUTPATIENT
Start: 2021-01-21 | End: 2021-01-29

## 2021-01-21 NOTE — PROGRESS NOTES
[address] [facility]  [address] [address]  Dept: [phone]  Dept Fax: [phone]: [phone]     Visit type: Established patient    Reason for Visit: Rash (Rash on his chest, had rash for a couple of months )      Assessment and Plan       1. Yeast infection of the skin  -     ketoconazole (NIZORAL) 2 % cream; Apply topically daily. , Disp-30 g, R-1, Normal  -     fluconazole (DIFLUCAN) 200 MG tablet; Take 1 tablet by mouth every 7 days for 2 doses, Disp-2 tablet, R-0Normal    Discussed diagnosis, expected course, and proper use of medication. Discussed proper care of involved area. Discussed signs and symptoms requiring medical attention. All questions were answered and patient voiced understanding and agreement with plan as discussed. Return if symptoms worsen or fail to improve, for next scheduled follow up with PCP. Subjective       HPI   Patient reports he has a rash on his chest and was previously seen with concerns of the acne but the antibiotics prescribed did not provide him any benefit to his rash. He states that all started on 3 months ago and has gotten worse and will itch more at certain times. He does state that sweating makes it seem to itch more. He denies any contributing factors that he is aware of he cannot think of any changes that contributed to the involved area. He has been using Dial soap in efforts of keeping the area clean and when he did get improvement with antibiotics prescribed he ended up using an over-the-counter antifungal cream which seemed to provide him some benefit but was not able to resolve the involved area. He denies any other aggravating or alleviating factors for his rash. Review of Systems   Constitutional: Negative for activity change, appetite change, fatigue and fever. HENT: Negative for congestion, rhinorrhea and sore throat. Eyes: Negative for pain and discharge.    Respiratory: Negative for cough and shortness of breath. Cardiovascular: Negative for chest pain and palpitations. Gastrointestinal: Negative for abdominal pain, constipation, diarrhea, nausea and vomiting. Endocrine: Negative for cold intolerance and heat intolerance. Genitourinary: Negative for dysuria and hematuria. Musculoskeletal: Negative for back pain, gait problem, myalgias and neck pain. Skin: Positive for rash. Negative for wound. Allergies   Allergen Reactions    Flagyl [Metronidazole]     Other      Pine Sol -  causes rash       Outpatient Medications Prior to Visit   Medication Sig Dispense Refill    Continuous Blood Gluc  (FREESTYLE OMI 2 READER SYSTM) SAM USE AS DIRECTED 1 Device 0    Continuous Blood Gluc Sensor (FREESTYLE OMI 2 SENSOR SYSTM) MISC 1 patch by Does not apply route daily 30 each 0    Insulin Pen Needle 32G X 4 MM MISC USE AS DIRECTED - pen needles for Tresiba 200 each 5    Insulin Degludec (TRESIBA FLEXTOUCH) 200 UNIT/ML SOPN Inject 65 Units into the skin nightly 6 pen 5    insulin lispro, 1 Unit Dial, (HUMALOG KWIKPEN) 100 UNIT/ML SOPN 1 unit per 6 carbs at meals pt states normally takes 20 units at each meal. 8 pen 5    Naftifine HCl 2 % CREA Apply to involved area daily 1 Tube 2    insulin lispro (HUMALOG KWIKPEN) 100 UNIT/ML pen Indications: 1 unit per 6 carbs at meals USE AS DIRECTED 8 pen 5    ONETOUCH DELICA LANCETS FINE MISC ONE LANCET PER TEST UP TO 6 TIMES A DAY DX:E10.31 1 each 2    blood glucose monitor strips TESTING 6 TIMES PER DAY DX:E10.31 200 strip 2     No facility-administered medications prior to visit.          Past Medical History:   Diagnosis Date    Diabetes mellitus type 1 ([address]     Diabetic retinopathy ([address]     RIGHT EYE    Diabetic retinopathy ([address]         Social History     Tobacco Use    Smoking status: Never Smoker    Smokeless tobacco: Never Used   Substance Use Topics    Alcohol use: No        Past Surgical General: Skin is warm and dry. Findings: Rash present. No erythema. Comments: Rash on face and chest that in some areas appear to be associated with hair follicles however he does have some areas that are consistent with fungal rash. Neurological:      Mental Status: He is alert and oriented to person, place, and time. Cranial Nerves: No cranial nerve deficit. Psychiatric:         Mood and Affect: Mood normal.         Behavior: Behavior normal.         Thought Content:  Thought content normal.           Data Reviewed and Summarized       Labs:     Imaging/Testing:          Shama Maldonado MD

## 2021-02-20 ASSESSMENT — ENCOUNTER SYMPTOMS
NAUSEA: 0
EYE PAIN: 0
CONSTIPATION: 0
ABDOMINAL PAIN: 0
SORE THROAT: 0
SHORTNESS OF BREATH: 0
EYE DISCHARGE: 0
BACK PAIN: 0
VOMITING: 0
RHINORRHEA: 0
COUGH: 0
DIARRHEA: 0

## 2021-02-21 NOTE — PATIENT INSTRUCTIONS
Patient Education        Yeast Skin Infection: Care Instructions  Your Care Instructions     Yeast normally lives on your skin. Sometimes too much yeast can overgrow in certain areas of the skin and cause an infection. The infection causes red, scaly, moist patches on your skin that may itch. Common areas for skin yeast infections are skin folds under the breasts or belly area. The warm and moist areas in the skin folds can make it easier for yeast to overgrow. Yeast infections also can be found on other parts of the body such as the groin or armpits. You will probably get a cream or ointment that contains an antifungal medicine. Examples of these are miconazole and clotrimazole. You put it on your skin to treat the infection. Your doctor may give you a prescription for the cream or ointment. Or you may be able to buy it without a prescription at most drugstores. If the infection is severe, the doctor will prescribe antifungal pills. A yeast infection usually goes away after about a week of treatment. But it's important to use the medicine for as long as your doctor tells you to. Follow-up care is a key part of your treatment and safety. Be sure to make and go to all appointments, and call your doctor if you are having problems. It's also a good idea to know your test results and keep a list of the medicines you take. How can you care for yourself at home? · Be safe with medicines. Take your medicines exactly as prescribed. Call your doctor if you think you are having a problem with your medicine. · Keep your skin clean and dry. Your doctor may suggest using powder that contains an antifungal medicine in the skin folds. · Wear loose clothing. When should you call for help? Call your doctor now or seek immediate medical care if:    · You have symptoms of infection, such as:  ? Increased pain, swelling, warmth, or redness. ? Red streaks leading from the area. ? Pus draining from the area. ? A fever. Watch closely for changes in your health, and be sure to contact your doctor if:    · You do not get better as expected. Where can you learn more? Go to [email or web address]://chpepiceweb.[email or web address] [email or web address] org and sign in to your [employer] account. Enter I317 in the Twelve box to learn more about \"Yeast Skin Infection: Care Instructions. \"     If you do not have an account, please click on the \"Sign Up Now\" link. Current as of: July 2, 2020               Content Version: 12.6  © 9818-3884 [employer]. Care instructions adapted under license by [facility] ([facility]). If you have questions about a medical condition or this instruction, always ask your healthcare professional. [address] any warranty or liability for your use of this information.

## 2021-05-12 ENCOUNTER — OFFICE VISIT (OUTPATIENT)
Dept: FAMILY MEDICINE CLINIC | Age: 36
End: 2021-05-12
Payer: COMMERCIAL

## 2021-05-12 VITALS
RESPIRATION RATE: 20 BRPM | TEMPERATURE: 97.2 F | HEART RATE: 90 BPM | HEIGHT: 69 IN | WEIGHT: 266 LBS | BODY MASS INDEX: 39.4 KG/M2 | SYSTOLIC BLOOD PRESSURE: 106 MMHG | DIASTOLIC BLOOD PRESSURE: 82 MMHG | OXYGEN SATURATION: 98 %

## 2021-05-12 DIAGNOSIS — B35.4 TINEA CORPORIS: Primary | ICD-10-CM

## 2021-05-12 DIAGNOSIS — L30.9 DERMATITIS: ICD-10-CM

## 2021-05-12 DIAGNOSIS — H91.93 DECREASED HEARING OF BOTH EARS: ICD-10-CM

## 2021-05-12 PROCEDURE — 99213 OFFICE O/P EST LOW 20 MIN: CPT | Performed by: FAMILY MEDICINE

## 2021-05-12 RX ORDER — FLUCONAZOLE 200 MG/1
200 TABLET ORAL
Qty: 2 TABLET | Refills: 0 | Status: SHIPPED | OUTPATIENT
Start: 2021-05-12 | End: 2021-05-20

## 2021-05-12 RX ORDER — TRIAMCINOLONE ACETONIDE 1 MG/G
CREAM TOPICAL
Qty: 1 TUBE | Refills: 0 | Status: SHIPPED | OUTPATIENT
Start: 2021-05-12 | End: 2021-12-20 | Stop reason: ALTCHOICE

## 2021-05-12 NOTE — PROGRESS NOTES
[facility]  [place]  [address] [address]  Dept: [phone]  Dept Fax: [phone]: [phone]     Visit type: Established patient    Reason for Visit: Rash (left leg ) and Cerumen Impaction (pt wants ears looked at)      Assessment and Plan       1. Tinea corporis  -     fluconazole (DIFLUCAN) 200 MG tablet; Take 1 tablet by mouth every 7 days for 2 doses, Disp-2 tablet, R-0Normal  2. Dermatitis  -     triamcinolone (KENALOG) 0.1 % cream; Apply topically 2 times daily. , Disp-1 Tube, R-0, Normal  3. Decreased hearing of both ears    Discussed diagnosis, expected course, and proper use of medication, including OTC medications if prescription is too expensive or insurance does not cover. Discussed the lack of significant cerumen in his ears bilaterally and offered the possibility of having a hearing test/getting set up with specialist and patient declined at this time. Discussed signs and symptoms requiring medical attention. All questions were answered and patient voiced understanding and agreement with plan as discussed. Return if symptoms worsen or fail to improve, for next scheduled follow up with PCP. Subjective       HPI   Patient reports that the spot on his leg that is consistent with previous rash on his chest that was fungal and improved with treatment provided previously. He has been using a cream on the area but has not really been able to get it cleared up. He has not really been having any other problems with the area and denies any specific aggravating or relieving factors for the involved area. He does state that he has had some decreased hearing and has been around loud noises at work. He states he has had problems with impacted cerumen previously and just wants to make sure/see if that is the problem. Review of Systems   Constitutional: Negative for activity change, appetite change, fatigue and fever.    HENT: Positive for hearing loss. Negative for congestion, rhinorrhea and sore throat. Eyes: Negative for pain and discharge. Respiratory: Negative for cough and shortness of breath. Cardiovascular: Negative for chest pain and palpitations. Gastrointestinal: Negative for abdominal pain, constipation, diarrhea, nausea and vomiting. Endocrine: Negative for cold intolerance and heat intolerance. Genitourinary: Negative for dysuria and hematuria. Musculoskeletal: Negative for back pain, gait problem, myalgias and neck pain. Skin: Positive for rash. Negative for wound. Allergies   Allergen Reactions    Flagyl [Metronidazole]     Other      Pine Sol -  causes rash       Outpatient Medications Prior to Visit   Medication Sig Dispense Refill    ketoconazole (NIZORAL) 2 % cream Apply topically daily. 30 g 1    Continuous Blood Gluc  (FREESTYLE OMI 2 READER SYSTM) SAM USE AS DIRECTED 1 Device 0    Continuous Blood Gluc Sensor (FREESTYLE OMI 2 SENSOR SYSTM) MISC 1 patch by Does not apply route daily 30 each 0    Insulin Pen Needle 32G X 4 MM MISC USE AS DIRECTED - pen needles for Tresiba 200 each 5    Insulin Degludec (TRESIBA FLEXTOUCH) 200 UNIT/ML SOPN Inject 65 Units into the skin nightly 6 pen 5    insulin lispro, 1 Unit Dial, (HUMALOG KWIKPEN) 100 UNIT/ML SOPN 1 unit per 6 carbs at meals pt states normally takes 20 units at each meal. 8 pen 5    Naftifine HCl 2 % CREA Apply to involved area daily 1 Tube 2    insulin lispro (HUMALOG KWIKPEN) 100 UNIT/ML pen Indications: 1 unit per 6 carbs at meals USE AS DIRECTED 8 pen 5    ONETOUCH DELICA LANCETS FINE MISC ONE LANCET PER TEST UP TO 6 TIMES A DAY DX:E10.31 1 each 2    blood glucose monitor strips TESTING 6 TIMES PER DAY DX:E10.31 200 strip 2     No facility-administered medications prior to visit.         Past Medical History:   Diagnosis Date    Diabetes mellitus type 1 ([address]     Diabetic retinopathy ([address]     RIGHT EYE    General: Bowel sounds are normal. There is no distension. Palpations: Abdomen is soft. Tenderness: There is no abdominal tenderness. Musculoskeletal:         General: No deformity (No gross deformities of upper or lower extremities) or signs of injury. Normal range of motion. Cervical back: Normal range of motion and neck supple. No muscular tenderness. Right lower leg: No edema. Left lower leg: No edema. Lymphadenopathy:      Cervical: No cervical adenopathy. Skin:     General: Skin is warm and dry. Findings: Rash present. No erythema. Comments: Rash on left lower extremity that does indeed appear consistent with fungal etiology. Neurological:      Mental Status: He is alert and oriented to person, place, and time. Cranial Nerves: No cranial nerve deficit. Psychiatric:         Mood and Affect: Mood normal.         Behavior: Behavior normal.         Thought Content:  Thought content normal.           Data Reviewed and Summarized       Labs:     Imaging/Testing:        Paul Espinoza MD

## 2021-05-19 ASSESSMENT — ENCOUNTER SYMPTOMS
SHORTNESS OF BREATH: 0
RHINORRHEA: 0
DIARRHEA: 0
COUGH: 0
VOMITING: 0
EYE DISCHARGE: 0
ABDOMINAL PAIN: 0
EYE PAIN: 0
NAUSEA: 0
BACK PAIN: 0
SORE THROAT: 0
CONSTIPATION: 0

## 2021-05-20 NOTE — PATIENT INSTRUCTIONS
Patient Education        Ringworm: Care Instructions  Your Care Instructions  Ringworm is a fungus infection of the skin. It is not caused by a worm. Ringworm causes a round, scaly rash that may crack and itch. The rash can spread over a wide area. One type of fungus that causes ringworm is often found in locker rooms and swimming pools. It grows well in warm, moist areas of the skin, such as in skin folds. You can get ringworm by sharing towels, clothing, and sports equipment. You can also get it by touching someone who has ringworm. Ringworm is treated with cream that kills the fungus. If the rash is widespread, you may need pills to get rid of it. Ringworm often comes back after treatment. If the rash becomes infected with bacteria, you may need antibiotics. Follow-up care is a key part of your treatment and safety. Be sure to make and go to all appointments, and call your doctor if you are having problems. It's also a good idea to know your test results and keep a list of the medicines you take. How can you care for yourself at home? · Take your medicines exactly as prescribed. Call your doctor if you have any problems with your medicine. · Wash the rash with soap and water, remove flaky skin, and dry thoroughly. · Try an over-the-counter antifungal cream. Spread the cream beyond the edge or border of the rash. Follow the directions on the package. Do not stop using the medicine just because your skin clears up. You will probably need to continue treatment for 2 to 4 weeks or longer. · To avoid spreading it, wash your hands well after treating or touching the rash. · To keep from getting another infection:  ? Do not go barefoot in public places such as gyms or locker rooms. Avoid sharing towels and clothes. Use flip-flops or some other type of shoe in the shower. ? Do not wear tight clothes or let your skin stay damp for long periods, such as by staying in a wet bathing suit or sweaty clothes.   When should you call for help? Call your doctor now or seek immediate medical care if:    · You have signs of infection such as:  ? Pain, warmth, or swelling in your skin. ? Red streaks near a wound in the skin. ? Pus coming from the rash on your skin. ? A fever. Watch closely for changes in your health, and be sure to contact your doctor if:    · Your ringworm does not improve after 2 weeks of treatment.     · You do not get better as expected. Where can you learn more? Go to [email or web address] org and sign in to your [employer] account. Enter Q519 in the [employer] box to learn more about \"Ringworm: Care Instructions. \"     If you do not have an account, please click on the \"Sign Up Now\" link. Current as of: July 2, 2020               Content Version: 12.8  © 3640-9124 [employer], [employer]. Care instructions adapted under license by [facility] [address] If you have questions about a medical condition or this instruction, always ask your healthcare professional. [address] any warranty or liability for your use of this information.

## 2021-10-25 DIAGNOSIS — E10.3593 TYPE 1 DIABETES MELLITUS WITH PROLIFERATIVE RETINOPATHY OF BOTH EYES, MACULAR EDEMA PRESENCE UNSPECIFIED, UNSPECIFIED PROLIFERATIVE RETINOPATHY TYPE (HCC): Chronic | ICD-10-CM

## 2021-10-26 RX ORDER — FLASH GLUCOSE SENSOR
KIT MISCELLANEOUS
Qty: 1 EACH | Refills: 0 | Status: SHIPPED | OUTPATIENT
Start: 2021-10-26 | End: 2021-12-21 | Stop reason: SDUPTHER

## 2021-12-15 DIAGNOSIS — E78.00 HYPERCHOLESTEREMIA: ICD-10-CM

## 2021-12-15 DIAGNOSIS — E10.3593 TYPE 1 DIABETES MELLITUS WITH PROLIFERATIVE RETINOPATHY OF BOTH EYES, MACULAR EDEMA PRESENCE UNSPECIFIED, UNSPECIFIED PROLIFERATIVE RETINOPATHY TYPE (HCC): Primary | ICD-10-CM

## 2021-12-15 DIAGNOSIS — E03.8 OTHER SPECIFIED HYPOTHYROIDISM: ICD-10-CM

## 2021-12-20 ENCOUNTER — OFFICE VISIT (OUTPATIENT)
Dept: FAMILY MEDICINE CLINIC | Age: 36
End: 2021-12-20
Payer: COMMERCIAL

## 2021-12-20 VITALS
BODY MASS INDEX: 38.21 KG/M2 | RESPIRATION RATE: 20 BRPM | HEART RATE: 85 BPM | DIASTOLIC BLOOD PRESSURE: 72 MMHG | WEIGHT: 258 LBS | OXYGEN SATURATION: 98 % | TEMPERATURE: 95.8 F | SYSTOLIC BLOOD PRESSURE: 120 MMHG | HEIGHT: 69 IN

## 2021-12-20 DIAGNOSIS — E78.00 HYPERCHOLESTEREMIA: Primary | ICD-10-CM

## 2021-12-20 DIAGNOSIS — E03.9 HYPOTHYROIDISM, UNSPECIFIED TYPE: ICD-10-CM

## 2021-12-20 DIAGNOSIS — E10.3593 TYPE 1 DIABETES MELLITUS WITH PROLIFERATIVE RETINOPATHY OF BOTH EYES, MACULAR EDEMA PRESENCE UNSPECIFIED, UNSPECIFIED PROLIFERATIVE RETINOPATHY TYPE (HCC): Chronic | ICD-10-CM

## 2021-12-20 PROCEDURE — 99214 OFFICE O/P EST MOD 30 MIN: CPT | Performed by: NURSE PRACTITIONER

## 2021-12-20 RX ORDER — INSULIN DEGLUDEC 200 U/ML
75 INJECTION, SOLUTION SUBCUTANEOUS NIGHTLY
Qty: 6 PEN | Refills: 5 | Status: SHIPPED | OUTPATIENT
Start: 2021-12-20 | End: 2022-04-04 | Stop reason: SDUPTHER

## 2021-12-20 RX ORDER — INSULIN LISPRO 100 [IU]/ML
40 INJECTION, SOLUTION INTRAVENOUS; SUBCUTANEOUS
Qty: 8 PEN | Refills: 5 | Status: SHIPPED | OUTPATIENT
Start: 2021-12-20 | End: 2022-04-04 | Stop reason: SDUPTHER

## 2021-12-20 SDOH — ECONOMIC STABILITY: FOOD INSECURITY: WITHIN THE PAST 12 MONTHS, YOU WORRIED THAT YOUR FOOD WOULD RUN OUT BEFORE YOU GOT MONEY TO BUY MORE.: NEVER TRUE

## 2021-12-20 SDOH — ECONOMIC STABILITY: FOOD INSECURITY: WITHIN THE PAST 12 MONTHS, THE FOOD YOU BOUGHT JUST DIDN'T LAST AND YOU DIDN'T HAVE MONEY TO GET MORE.: NEVER TRUE

## 2021-12-20 ASSESSMENT — ENCOUNTER SYMPTOMS
ABDOMINAL PAIN: 0
EYE PAIN: 0
DIARRHEA: 0
TROUBLE SWALLOWING: 0
BLOOD IN STOOL: 0
SHORTNESS OF BREATH: 0
COUGH: 0
VOMITING: 0

## 2021-12-20 ASSESSMENT — SOCIAL DETERMINANTS OF HEALTH (SDOH): HOW HARD IS IT FOR YOU TO PAY FOR THE VERY BASICS LIKE FOOD, HOUSING, MEDICAL CARE, AND HEATING?: NOT HARD AT ALL

## 2021-12-20 NOTE — PROGRESS NOTES
disturbance. Negative for pain. Respiratory: Negative for cough and shortness of breath. Cardiovascular: Negative for chest pain and leg swelling. Gastrointestinal: Negative for abdominal pain, blood in stool, diarrhea and vomiting. Genitourinary: Positive for frequency. Negative for difficulty urinating. Musculoskeletal: Positive for arthralgias and myalgias. Neurological: Negative for dizziness, light-headedness and headaches. Psychiatric/Behavioral: Negative for sleep disturbance. The patient is not nervous/anxious. OBJECTIVE:    Physical Exam  Vitals reviewed. Constitutional:       Appearance: Normal appearance. He is well-developed. HENT:      Head: Normocephalic and atraumatic. Right Ear: Tympanic membrane, ear canal and external ear normal. There is no impacted cerumen. Left Ear: Tympanic membrane, ear canal and external ear normal. There is no impacted cerumen. Nose: Nose normal.      Mouth/Throat:      Lips: Pink. Mouth: Mucous membranes are moist.      Dentition: Normal dentition. Tongue: No lesions. Pharynx: Oropharynx is clear. Uvula midline. Tonsils: No tonsillar exudate or tonsillar abscesses. Eyes:      General: Lids are normal.         Right eye: No discharge. Left eye: No discharge. Extraocular Movements:      Right eye: Normal extraocular motion. Left eye: Normal extraocular motion. Conjunctiva/sclera: Conjunctivae normal.      Right eye: Right conjunctiva is not injected. Left eye: Left conjunctiva is not injected. Pupils: Pupils are equal, round, and reactive to light. Neck:      Thyroid: No thyromegaly. Vascular: No carotid bruit or JVD. Cardiovascular:      Rate and Rhythm: Normal rate and regular rhythm. Pulses:           Carotid pulses are 2+ on the right side and 2+ on the left side. Radial pulses are 2+ on the right side and 2+ on the left side.       Heart sounds: Normal heart sounds, S1 normal and S2 normal. No murmur heard. Pulmonary:      Effort: Pulmonary effort is normal. No accessory muscle usage. Breath sounds: Normal breath sounds. Abdominal:      General: Bowel sounds are normal. There is no distension or abdominal bruit. Palpations: Abdomen is soft. There is no mass. Tenderness: There is no abdominal tenderness. Hernia: No hernia is present. Musculoskeletal:         General: Normal range of motion. Cervical back: Normal range of motion and neck supple. Right lower leg: No edema. Left lower leg: No edema. Lymphadenopathy:      Cervical:      Right cervical: No superficial cervical adenopathy. Left cervical: No superficial cervical adenopathy. Skin:     General: Skin is warm and dry. Coloration: Skin is not jaundiced or pale. Findings: No lesion or rash. Nails: There is no clubbing. Neurological:      Mental Status: He is alert and oriented to person, place, and time. Cranial Nerves: No facial asymmetry. Motor: No weakness or tremor. Coordination: Coordination normal.      Gait: Gait normal.      Deep Tendon Reflexes: Reflexes are normal and symmetric. Psychiatric:         Attention and Perception: Attention normal.         Mood and Affect: Mood normal.         Speech: Speech normal.         Behavior: Behavior normal.         Thought Content: Thought content normal.         Cognition and Memory: Memory normal.         Judgment: Judgment normal.        /72 (Site: Left Upper Arm, Position: Sitting, Cuff Size: Large Adult)   Pulse 85   Temp 95.8 °F (35.4 °C) (Temporal)   Resp 20   Ht 5' 9\" (1.753 m)   Wt 258 lb (117 kg)   SpO2 98%   BMI 38.10 kg/m²      ASSESSMENT:      ICD-10-CM    1. Hypercholesteremia  E78.00 Lipid, Fasting   2.  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 Hypothyroidism, unspecified type  E03.9 TSH without Reflex     T4, Free       PLAN:    Syed Barnett: Diabetes (check up . blood sugars run between 180 and 200 . he does have diabetic retinopathy )  review labs  Pt told to come into Office every 6 months for labs and OV     Diagnosis and orders for thisvisit are above. All patient questions answered. Pt voiced understanding. Reviewedhealth maintenance. Instructed to continue current medications, diet and exercise. Patient agreed with treatment plan. Follow up as directed.

## 2021-12-21 DIAGNOSIS — E10.3593 TYPE 1 DIABETES MELLITUS WITH PROLIFERATIVE RETINOPATHY OF BOTH EYES, MACULAR EDEMA PRESENCE UNSPECIFIED, UNSPECIFIED PROLIFERATIVE RETINOPATHY TYPE (HCC): Chronic | ICD-10-CM

## 2021-12-21 RX ORDER — FLASH GLUCOSE SENSOR
KIT MISCELLANEOUS
Qty: 1 EACH | Refills: 0 | Status: SHIPPED | OUTPATIENT
Start: 2021-12-21 | End: 2022-01-11 | Stop reason: SDUPTHER

## 2022-01-11 DIAGNOSIS — E10.3593 TYPE 1 DIABETES MELLITUS WITH PROLIFERATIVE RETINOPATHY OF BOTH EYES, MACULAR EDEMA PRESENCE UNSPECIFIED, UNSPECIFIED PROLIFERATIVE RETINOPATHY TYPE (HCC): Chronic | ICD-10-CM

## 2022-01-11 RX ORDER — FLASH GLUCOSE SENSOR
KIT MISCELLANEOUS
Qty: 100 EACH | Refills: 0 | Status: SHIPPED | OUTPATIENT
Start: 2022-01-11 | End: 2022-02-07 | Stop reason: SDUPTHER

## 2022-01-11 NOTE — TELEPHONE ENCOUNTER
For [employer]' sensors they come as 2 sensors a month . There are only 2 in a pack . So we need to order enough for a couple months .

## 2022-02-07 ENCOUNTER — TELEPHONE (OUTPATIENT)
Dept: FAMILY MEDICINE CLINIC | Age: 37
End: 2022-02-07

## 2022-02-07 ENCOUNTER — HOSPITAL ENCOUNTER (OUTPATIENT)
Dept: GENERAL RADIOLOGY | Age: 37
Discharge: HOME | End: 2022-02-07
Payer: COMMERCIAL

## 2022-02-07 ENCOUNTER — NURSE TRIAGE (OUTPATIENT)
Dept: OTHER | Facility: CLINIC | Age: 37
End: 2022-02-07

## 2022-02-07 ENCOUNTER — OFFICE VISIT (OUTPATIENT)
Dept: FAMILY MEDICINE CLINIC | Age: 37
End: 2022-02-07
Payer: COMMERCIAL

## 2022-02-07 VITALS
OXYGEN SATURATION: 95 % | DIASTOLIC BLOOD PRESSURE: 80 MMHG | BODY MASS INDEX: 38.4 KG/M2 | SYSTOLIC BLOOD PRESSURE: 132 MMHG | WEIGHT: 260 LBS | TEMPERATURE: 96.9 F | HEART RATE: 86 BPM

## 2022-02-07 DIAGNOSIS — M25.512 CHRONIC PAIN OF BOTH SHOULDERS: ICD-10-CM

## 2022-02-07 DIAGNOSIS — M25.511 CHRONIC PAIN OF BOTH SHOULDERS: ICD-10-CM

## 2022-02-07 DIAGNOSIS — G89.29 CHRONIC PAIN OF BOTH SHOULDERS: ICD-10-CM

## 2022-02-07 DIAGNOSIS — F43.0 STRESS REACTION: ICD-10-CM

## 2022-02-07 DIAGNOSIS — E10.3593 TYPE 1 DIABETES MELLITUS WITH PROLIFERATIVE RETINOPATHY OF BOTH EYES, MACULAR EDEMA PRESENCE UNSPECIFIED, UNSPECIFIED PROLIFERATIVE RETINOPATHY TYPE (HCC): Chronic | ICD-10-CM

## 2022-02-07 DIAGNOSIS — E10.628 TYPE 1 DIABETES MELLITUS WITH OTHER SKIN COMPLICATION (HCC): Primary | ICD-10-CM

## 2022-02-07 PROCEDURE — 99214 OFFICE O/P EST MOD 30 MIN: CPT | Performed by: NURSE PRACTITIONER

## 2022-02-07 PROCEDURE — 73030 X-RAY EXAM OF SHOULDER: CPT

## 2022-02-07 RX ORDER — FLASH GLUCOSE SENSOR
KIT MISCELLANEOUS
Qty: 2 EACH | Refills: 5 | Status: SHIPPED | OUTPATIENT
Start: 2022-02-07 | End: 2022-08-09 | Stop reason: SINTOL

## 2022-02-07 RX ORDER — ESCITALOPRAM OXALATE 10 MG/1
10 TABLET ORAL DAILY
Qty: 30 TABLET | Refills: 0 | Status: SHIPPED | OUTPATIENT
Start: 2022-02-07 | End: 2022-03-15

## 2022-02-07 RX ORDER — DICLOFENAC SODIUM 75 MG/1
75 TABLET, DELAYED RELEASE ORAL 2 TIMES DAILY
Qty: 60 TABLET | Refills: 0 | Status: SHIPPED | OUTPATIENT
Start: 2022-02-07 | End: 2022-03-15

## 2022-02-07 ASSESSMENT — ENCOUNTER SYMPTOMS
GASTROINTESTINAL NEGATIVE: 1
RESPIRATORY NEGATIVE: 1

## 2022-02-07 NOTE — PROGRESS NOTES
SUBJECTIVE:  Shoulder,  Pain Anxiety  Patient ID: Katina Guzman is a 39 y.o. male. HPI:   HPI   Shoulder Pain: no injury Think it from years of work. Gradually getting worse. He ran a tree service cutting wood. If have to raise arms increase in pain. Can't sleep due to pain. Some days with rest will not have pain or will be a dull pain. Can't reach middle of back or over head. Right will be top of shoulder mid clavicle and then over the deltoid to the elbow  And a little to the back. Left side same as right just no as bad. No xrays. First time checked out     Feet: can't trim toenails. No neuropathy     Anxiety: Up and down days irritable quick to snap. Was on med years ago. Didn't like the way it made him feel. He stopped taking it . Made him feel like he didn't want to do anything at all. Past Medical History:   Diagnosis Date    [facility]     Diabetic retinopathy ([facility]     RIGHT EYE    Diabetic retinopathy ([facility]       Prior to Visit Medications    Medication Sig Taking?  Authorizing Provider   diclofenac (VOLTAREN) 75 MG EC tablet Take 1 tablet by mouth 2 times daily Yes SADIE Moran   escitalopram (LEXAPRO) 10 MG tablet Take 1 tablet by mouth daily For the first 2 weeks take a 1/2 tablet Yes SADIE Moran   Continuous Blood Gluc Sensor (FREESTYLE OMI 2 SENSOR) MISC USE AS DIRECTED Yes SADIE Moran   Insulin Degludec (TRESIBA FLEXTOUCH) 200 UNIT/ML SOPN Inject 75 Units into the skin nightly Yes SADIE Moran   insulin lispro, 1 Unit Dial, (HUMALOG KWIKPEN) 100 UNIT/ML SOPN Inject 40 Units into the skin 4 times daily (with meals and nightly) Yes SADIE Moran   Continuous Blood Gluc  (FREESTYLE OMI 2 READER SYSTM) SAM USE AS DIRECTED Yes SADIE Moran   Insulin Pen Needle 32G X 4 MM MISC USE AS DIRECTED - pen needles for [employer] Yes SADIE Moran   ONETOUCH DELICA LANCETS FINE MISC ONE LANCET PER TEST UP TO 6 TIMES A DAY DX:E10.31 Yes Kenneth Nassar MD   blood glucose monitor strips TESTING 6 TIMES PER DAY DX:E10.31 Yes Kenneth Nassar MD      Allergies   Allergen Reactions    Flagyl [Metronidazole]     Other      Pine Sol -  causes rash       Review of Systems   Constitutional: Negative. HENT: Negative. Respiratory: Negative. Cardiovascular: Negative. Gastrointestinal: Negative. Genitourinary: Negative. Musculoskeletal: Positive for arthralgias and myalgias. Negative for joint swelling. Neurological: Negative. Psychiatric/Behavioral: Positive for agitation and sleep disturbance. The patient is nervous/anxious. OBJECTIVE:    Physical Exam  Constitutional:       Appearance: Normal appearance. He is well-developed. He is not ill-appearing. HENT:      Head: Normocephalic and atraumatic. Right Ear: External ear normal.      Left Ear: External ear normal.      Nose: Nose normal.      Mouth/Throat:      Lips: Pink. Mouth: Mucous membranes are moist.   Eyes:      General: Lids are normal.      Extraocular Movements: Extraocular movements intact. Conjunctiva/sclera: Conjunctivae normal.   Cardiovascular:      Rate and Rhythm: Normal rate and regular rhythm. Heart sounds: Normal heart sounds. No murmur heard. Pulmonary:      Effort: Pulmonary effort is normal.      Breath sounds: Normal breath sounds. Musculoskeletal:      Right shoulder: Tenderness present. Decreased range of motion. Normal pulse. Left shoulder: Tenderness present. Decreased range of motion. Normal pulse. Cervical back: Normal range of motion. Skin:     General: Skin is warm and dry. Neurological:      Mental Status: He is alert and oriented to person, place, and time. Motor: No weakness or tremor. Coordination: Coordination is intact.    Psychiatric:         Attention and Perception: Attention and perception normal.         Mood and Affect: Mood normal. Speech: Speech normal.         Behavior: Behavior normal. Behavior is cooperative. Thought Content: Thought content normal.         Cognition and Memory: Cognition and memory normal.         Judgment: Judgment normal.        /80 (Site: Left Upper Arm, Position: Sitting, Cuff Size: Large Adult)   Pulse 86   Temp 96.9 °F (36.1 °C) (Temporal)   Wt 260 lb (117.9 kg)   SpO2 95%   BMI 38.40 kg/m²      ASSESSMENT:      ICD-10-CM    1. Type 1 diabetes mellitus with other skin complication (HCC)  G34.677 External Referral To Podiatry   2. Chronic pain of both shoulders  M25.511 XR SHOULDER LEFT (MIN 2 VIEWS)    G89.29 XR SHOULDER RIGHT (MIN 2 VIEWS)    M25.512 diclofenac (VOLTAREN) 75 MG EC tablet   3. Stress reaction  F43.0 escitalopram (LEXAPRO) 10 MG tablet       PLAN:    Carole Barnett: Shoulder Pain (bilateral shoulder pain, right radiates down to elbow), Anxiety, and Foot Problem (need referral for podiatry)  RTC in 6 weeks   Review XR   Work excuse  Diagnosis and orders for this visit are above.

## 2022-02-07 NOTE — TELEPHONE ENCOUNTER
Received call from [facility] ETKAVITA at [facility] with Red Flag Complaint. Subjective: Caller states \"I have been having some shoulder pain, in the top of both shoulders, my right arm goes numb sometimes, and my elbow is hurting\"     Current Symptoms: shoulder pain-both shoulders, right arm numbness, general anxiety- denies any suicidal/ homicidal thoughts. Patient is also requesting a referral to podiatry to have his toe nails cut since he is a diabetic. Onset: 6 minute ago; worsening    Pain Severity: 8/10; sharp; intermittent    Temperature: denies     What has been tried: motrin    Recommended disposition: to be seen today or tomorrow    Care advice provided, patient verbalizes understanding; denies any other questions or concerns; instructed to call back for any new or worsening symptoms. Writer provided warm transfer to . at [facility] for appointment scheduling     Attention Provider: Thank you for allowing me to participate in the care of your patient. The patient was connected to triage in response to information provided to the ECC/PSC. Please do not respond through this encounter as the response is not directed to a shared pool.           Reason for Disposition   Patient wants to be seen    Protocols used: SHOULDER PAIN-ADULT-[place]

## 2022-02-21 ENCOUNTER — HOSPITAL ENCOUNTER (OUTPATIENT)
Dept: GENERAL RADIOLOGY | Age: 37
Discharge: HOME | End: 2022-02-21
Payer: COMMERCIAL

## 2022-02-21 ENCOUNTER — OFFICE VISIT (OUTPATIENT)
Dept: FAMILY MEDICINE CLINIC | Age: 37
End: 2022-02-21
Payer: COMMERCIAL

## 2022-02-21 VITALS
HEART RATE: 87 BPM | TEMPERATURE: 96.5 F | RESPIRATION RATE: 20 BRPM | SYSTOLIC BLOOD PRESSURE: 128 MMHG | DIASTOLIC BLOOD PRESSURE: 78 MMHG | OXYGEN SATURATION: 96 % | WEIGHT: 260 LBS | BODY MASS INDEX: 38.4 KG/M2

## 2022-02-21 DIAGNOSIS — G95.89 CERVICAL SPINAL MASS (HCC): ICD-10-CM

## 2022-02-21 DIAGNOSIS — R93.89 ABNORMAL X-RAY: Primary | ICD-10-CM

## 2022-02-21 DIAGNOSIS — R93.89 ABNORMAL X-RAY: ICD-10-CM

## 2022-02-21 DIAGNOSIS — E10.3593 TYPE 1 DIABETES MELLITUS WITH PROLIFERATIVE RETINOPATHY OF BOTH EYES, MACULAR EDEMA PRESENCE UNSPECIFIED, UNSPECIFIED PROLIFERATIVE RETINOPATHY TYPE (HCC): Primary | ICD-10-CM

## 2022-02-21 PROCEDURE — 99213 OFFICE O/P EST LOW 20 MIN: CPT | Performed by: NURSE PRACTITIONER

## 2022-02-21 PROCEDURE — 93880 EXTRACRANIAL BILAT STUDY: CPT

## 2022-02-21 RX ORDER — SIMVASTATIN 20 MG
20 TABLET ORAL EVERY EVENING
Qty: 30 TABLET | Refills: 5 | Status: SHIPPED | OUTPATIENT
Start: 2022-02-21 | End: 2022-08-09 | Stop reason: SDUPTHER

## 2022-02-21 ASSESSMENT — ENCOUNTER SYMPTOMS: PHOTOPHOBIA: 0

## 2022-02-21 NOTE — LETTER
[facility]  [address] [address]  Phone: [phone]  Fax: [phone]    SADIE Szymanski        February 21, 2022     Patient: Amrik Wu   YOB: 1985   Date of Visit: 2/21/2022       To Whom It May Concern: It is my medical opinion that Amrik Wu may return to work on this includes Friday and today with return on 2/22/2022. If you have any questions or concerns, please don't hesitate to call.     Sincerely,        SADIE Szymanski

## 2022-02-21 NOTE — PROGRESS NOTES
SUBJECTIVE:  Abnormal x-ray  Patient ID: Tk Khan is a 39 y.o. male. HPI:   HPI   XRay  He was at work and he had there is some type of thing that gets blown out and it blew out but then came down directly on his head and shoulder area so they took him to the emergency room and they did an x-ray of his neck and making sure that nothing was fractured and what did show up was that he had some calcifications in his carotid arteries which they stated was very unusual for his age but course he is a type I diabetic and then also what showed up was lateral masses on C1 and C2 which need to be further evaluated so he is here today getting those further evaluated  I treated him earlier for shoulder pain and he had started to improve until the same came down on his head neck and shoulder area and now it is hurting quite a bit but due to the workman's injury they have an got him scheduled for an MRI  He also evaluated the medication for anxiety and he states that it is a little early only taking for 2 weeks  he cannottell that thing has changed  Past Medical History:   Diagnosis Date    Diabetes mellitus type 1 ([address]     Diabetic retinopathy ([address]     RIGHT EYE    Diabetic retinopathy ([address]       Prior to Visit Medications    Medication Sig Taking?  Authorizing Provider   diclofenac (VOLTAREN) 75 MG EC tablet Take 1 tablet by mouth 2 times daily Yes SADIE Waite   escitalopram (LEXAPRO) 10 MG tablet Take 1 tablet by mouth daily For the first 2 weeks take a 1/2 tablet Yes SADIE Waite   Continuous Blood Gluc Sensor (FREESTYLE OMI 2 SENSOR) MISC Use 1 sensor every 14 days Yes SADIE Waite   Insulin Degludec (TRESIBA FLEXTOUCH) 200 UNIT/ML SOPN Inject 75 Units into the skin nightly Yes SADIE Waite   insulin lispro, 1 Unit Dial, (HUMALOG KWIKPEN) 100 UNIT/ML SOPN Inject 40 Units into the skin 4 times daily (with meals and nightly) Yes SADIE Waite   Continuous Blood Gluc  (FREESTYLE OMI 2 READER SYSTM) SAM USE AS DIRECTED Yes Claretha Pouch, APRN   Insulin Pen Needle 32G X 4 MM MISC USE AS DIRECTED - pen needles for Shaista Trevino Yes Claretha Pouch, APRN   ONETOUCH DELICA LANCETS FINE MISC ONE LANCET PER TEST UP TO 6 TIMES A DAY DX:E10.31 Yes Tara oBlden MD   blood glucose monitor strips TESTING 6 TIMES PER DAY DX:E10.31 Yes Tara Bolden MD      Allergies   Allergen Reactions    Flagyl [Metronidazole]     Other      Pine Sol -  causes rash       Review of Systems   Eyes: Negative for photophobia and visual disturbance. Musculoskeletal: Positive for arthralgias and myalgias. Neurological: Negative for dizziness and headaches. Psychiatric/Behavioral: Positive for agitation. The patient is nervous/anxious. OBJECTIVE:    Physical Exam  Constitutional:       Appearance: Normal appearance. He is well-developed. He is not ill-appearing. HENT:      Head: Normocephalic and atraumatic. Right Ear: External ear normal.      Left Ear: External ear normal.      Nose: Nose normal.      Mouth/Throat:      Lips: Pink. Mouth: Mucous membranes are moist.   Eyes:      General: Lids are normal.      Extraocular Movements: Extraocular movements intact. Conjunctiva/sclera: Conjunctivae normal.   Cardiovascular:      Rate and Rhythm: Normal rate and regular rhythm. Heart sounds: Normal heart sounds. No murmur heard. Pulmonary:      Effort: Pulmonary effort is normal.      Breath sounds: Normal breath sounds. Musculoskeletal:      Cervical back: Normal range of motion. Skin:     General: Skin is warm and dry. Neurological:      Mental Status: He is alert and oriented to person, place, and time. Motor: No weakness or tremor. Coordination: Coordination is intact.    Psychiatric:         Attention and Perception: Attention and perception normal.         Mood and Affect: Mood normal.         Speech: Speech normal. Behavior: Behavior normal. Behavior is cooperative. Thought Content: Thought content normal.         Cognition and Memory: Cognition and memory normal.         Judgment: Judgment normal.        /78 (Site: Left Upper Arm, Position: Sitting, Cuff Size: Large Adult)   Pulse 87   Temp 96.5 °F (35.8 °C) (Temporal)   Resp 20   Wt 260 lb (117.9 kg)   SpO2 96%   BMI 38.40 kg/m²      ASSESSMENT:      ICD-10-CM    1. Abnormal x-ray  R93.89 US CAROTID ARTERY BILATERAL     MRI CERVICAL SPINE WO CONTRAST   2. Cervical spinal mass ([address]  G95.89 MRI CERVICAL SPINE WO CONTRAST       PLAN:    Daylin Barnett: Follow-up (went to [facility] with his job. they did an xray of his neck that shows blockage )    Review Us and MRI  Work excuse  Diagnosis and orders for this visit are above.

## 2022-02-25 ENCOUNTER — HOSPITAL ENCOUNTER (OUTPATIENT)
Dept: MRI IMAGING | Age: 37
Discharge: HOME | End: 2022-02-25
Payer: COMMERCIAL

## 2022-02-25 DIAGNOSIS — R93.89 ABNORMAL X-RAY: ICD-10-CM

## 2022-02-25 DIAGNOSIS — G95.89 CERVICAL SPINAL MASS (HCC): ICD-10-CM

## 2022-02-25 PROCEDURE — 72141 MRI NECK SPINE W/O DYE: CPT

## 2022-03-14 DIAGNOSIS — G89.29 CHRONIC PAIN OF BOTH SHOULDERS: ICD-10-CM

## 2022-03-14 DIAGNOSIS — F43.0 STRESS REACTION: ICD-10-CM

## 2022-03-14 DIAGNOSIS — M25.511 CHRONIC PAIN OF BOTH SHOULDERS: ICD-10-CM

## 2022-03-14 DIAGNOSIS — M25.512 CHRONIC PAIN OF BOTH SHOULDERS: ICD-10-CM

## 2022-03-15 RX ORDER — DICLOFENAC SODIUM 75 MG/1
TABLET, DELAYED RELEASE ORAL
Qty: 60 TABLET | Refills: 5 | Status: SHIPPED | OUTPATIENT
Start: 2022-03-15 | End: 2022-04-04 | Stop reason: SDUPTHER

## 2022-03-15 RX ORDER — ESCITALOPRAM OXALATE 10 MG/1
TABLET ORAL
Qty: 30 TABLET | Refills: 5 | Status: SHIPPED | OUTPATIENT
Start: 2022-03-15 | End: 2022-04-04 | Stop reason: SDUPTHER

## 2022-03-23 ENCOUNTER — TELEPHONE (OUTPATIENT)
Dept: FAMILY MEDICINE CLINIC | Age: 37
End: 2022-03-23

## 2022-03-23 DIAGNOSIS — G95.89 CERVICAL SPINAL MASS (HCC): Primary | ICD-10-CM

## 2022-03-23 NOTE — TELEPHONE ENCOUNTER
----- Message from SADIE Hanson sent at 3/7/2022 11:48 AM CST -----  MRI of the cervical spine shows a patchy abnormal signal in the subcu soft tissues this is up around the neck area so I am guessing that maybe the cervical spine looks okay but I am wondering if he needs to see an ENT

## 2022-04-04 DIAGNOSIS — E10.3593 TYPE 1 DIABETES MELLITUS WITH PROLIFERATIVE RETINOPATHY OF BOTH EYES, MACULAR EDEMA PRESENCE UNSPECIFIED, UNSPECIFIED PROLIFERATIVE RETINOPATHY TYPE (HCC): Chronic | ICD-10-CM

## 2022-04-04 DIAGNOSIS — M25.512 CHRONIC PAIN OF BOTH SHOULDERS: ICD-10-CM

## 2022-04-04 DIAGNOSIS — F43.0 STRESS REACTION: ICD-10-CM

## 2022-04-04 DIAGNOSIS — G89.29 CHRONIC PAIN OF BOTH SHOULDERS: ICD-10-CM

## 2022-04-04 DIAGNOSIS — M25.511 CHRONIC PAIN OF BOTH SHOULDERS: ICD-10-CM

## 2022-04-04 RX ORDER — INSULIN LISPRO 100 [IU]/ML
40 INJECTION, SOLUTION INTRAVENOUS; SUBCUTANEOUS
Qty: 8 PEN | Refills: 5 | Status: SHIPPED | OUTPATIENT
Start: 2022-04-04 | End: 2022-08-09 | Stop reason: SDUPTHER

## 2022-04-04 RX ORDER — ESCITALOPRAM OXALATE 10 MG/1
10 TABLET ORAL DAILY
Qty: 30 TABLET | Refills: 5 | Status: SHIPPED | OUTPATIENT
Start: 2022-04-04 | End: 2022-08-09

## 2022-04-04 RX ORDER — DICLOFENAC SODIUM 75 MG/1
75 TABLET, DELAYED RELEASE ORAL 2 TIMES DAILY
Qty: 60 TABLET | Refills: 5 | Status: SHIPPED | OUTPATIENT
Start: 2022-04-04 | End: 2022-08-09

## 2022-04-04 RX ORDER — INSULIN DEGLUDEC 200 U/ML
75 INJECTION, SOLUTION SUBCUTANEOUS NIGHTLY
Qty: 6 PEN | Refills: 5 | Status: SHIPPED | OUTPATIENT
Start: 2022-04-04 | End: 2022-08-09 | Stop reason: SDUPTHER

## 2022-08-09 ENCOUNTER — OFFICE VISIT (OUTPATIENT)
Dept: FAMILY MEDICINE CLINIC | Age: 37
End: 2022-08-09
Payer: COMMERCIAL

## 2022-08-09 VITALS
WEIGHT: 265 LBS | RESPIRATION RATE: 20 BRPM | DIASTOLIC BLOOD PRESSURE: 78 MMHG | BODY MASS INDEX: 39.13 KG/M2 | TEMPERATURE: 97 F | OXYGEN SATURATION: 98 % | SYSTOLIC BLOOD PRESSURE: 130 MMHG | HEART RATE: 86 BPM

## 2022-08-09 DIAGNOSIS — E10.3593 TYPE 1 DIABETES MELLITUS WITH PROLIFERATIVE RETINOPATHY OF BOTH EYES, MACULAR EDEMA PRESENCE UNSPECIFIED, UNSPECIFIED PROLIFERATIVE RETINOPATHY TYPE (HCC): ICD-10-CM

## 2022-08-09 DIAGNOSIS — L97.529 ULCER OF BOTH FEET, UNSPECIFIED ULCER STAGE (HCC): Primary | ICD-10-CM

## 2022-08-09 DIAGNOSIS — E03.9 HYPOTHYROIDISM, UNSPECIFIED TYPE: ICD-10-CM

## 2022-08-09 DIAGNOSIS — E78.00 HYPERCHOLESTEREMIA: ICD-10-CM

## 2022-08-09 DIAGNOSIS — L97.519 ULCER OF BOTH FEET, UNSPECIFIED ULCER STAGE (HCC): Primary | ICD-10-CM

## 2022-08-09 PROCEDURE — 99214 OFFICE O/P EST MOD 30 MIN: CPT | Performed by: NURSE PRACTITIONER

## 2022-08-09 RX ORDER — SIMVASTATIN 20 MG
20 TABLET ORAL EVERY EVENING
Qty: 90 TABLET | Refills: 1 | Status: SHIPPED | OUTPATIENT
Start: 2022-08-09 | End: 2023-02-05

## 2022-08-09 RX ORDER — INSULIN DEGLUDEC 200 U/ML
75 INJECTION, SOLUTION SUBCUTANEOUS NIGHTLY
Qty: 6 PEN | Refills: 5 | Status: SHIPPED | OUTPATIENT
Start: 2022-08-09

## 2022-08-09 RX ORDER — INSULIN LISPRO 100 [IU]/ML
40 INJECTION, SOLUTION INTRAVENOUS; SUBCUTANEOUS
Qty: 8 PEN | Refills: 5 | Status: SHIPPED | OUTPATIENT
Start: 2022-08-09

## 2022-08-09 ASSESSMENT — ENCOUNTER SYMPTOMS
VOICE CHANGE: 0
ABDOMINAL PAIN: 0
COLOR CHANGE: 1
CONSTIPATION: 0
COUGH: 0
TROUBLE SWALLOWING: 0
SHORTNESS OF BREATH: 0

## 2022-08-09 NOTE — PROGRESS NOTES
SUBJECTIVE:  Dm and foot ulcer   Patient ID: Brenda Elise is a 39 y.o. male. HPI:   HPI   Puncture site of the right heel Is not draining. Doesn't go barefoot. Skin was peeling back. It is painful to step on it. Area on the ankle outside triangle 1-2 days. Doesn't know if it was rubbing on boot. -200. Stress work. Past Medical History:   Diagnosis Date    Diabetes mellitus type 1 ([address]     Diabetic retinopathy ([address]     RIGHT EYE    Diabetic retinopathy ([address]       Prior to Visit Medications    Medication Sig Taking? Authorizing Provider   mupirocin (BACTROBAN) 2 % ointment Apply topically 3 times daily. Ankle and bottom of heel Yes SADIE Chong   simvastatin (ZOCOR) 20 MG tablet Take 1 tablet by mouth every evening Yes SADIE Chong   Insulin Degludec (TRESIBA FLEXTOUCH) 200 UNIT/ML SOPN Inject 75 Units into the skin nightly Yes SADIE Chong   insulin lispro, 1 Unit Dial, (HUMALOG KWIKPEN) 100 UNIT/ML SOPN Inject 40 Units into the skin 4 times daily (with meals and nightly) Yes SADIE Chong   Insulin Pen Needle 32G X 4 MM MISC USE AS DIRECTED - pen needles for [facility] Yes SADIE Chong   ONETOUCH DELICA LANCETS FINE MISC ONE LANCET PER TEST UP TO 6 TIMES A DAY DX:E10.31 Yes Sven Adams MD   blood glucose monitor strips TESTING 6 TIMES PER DAY DX:E10.31 Yes Sven Adams MD     Allergies   Allergen Reactions    Flagyl [Metronidazole]     Other      Pine Sol -  causes rash       Review of Systems   Constitutional:  Negative for fatigue and fever. HENT:  Negative for trouble swallowing and voice change. Respiratory:  Negative for cough and shortness of breath. Cardiovascular:  Negative for chest pain and leg swelling. Gastrointestinal:  Negative for abdominal pain and constipation. Genitourinary:  Negative for difficulty urinating and frequency. Musculoskeletal:  Negative for arthralgias and gait problem. Skin:  Positive for color change and wound. Neurological:  Negative for dizziness and numbness. Psychiatric/Behavioral:  Negative for sleep disturbance. The patient is nervous/anxious. OBJECTIVE:    Physical Exam   /78 (Site: Left Upper Arm, Position: Sitting, Cuff Size: Large Adult)   Pulse 86   Temp 97 °F (36.1 °C) (Temporal)   Resp 20   Wt 265 lb (120.2 kg)   SpO2 98%   BMI 39.13 kg/m²      ASSESSMENT:      ICD-10-CM    1. Ulcer of both feet, unspecified ulcer stage (HCC)  L97.519 mupirocin (BACTROBAN) 2 % ointment    L97.529 External Referral To Podiatry      2. 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 Hypercholesteremia  E78.00 Comprehensive Metabolic Panel     CBC with Auto Differential     Lipid, Fasting      4. Hypothyroidism, unspecified type  E03.9 TSH     T4, Free          PLAN:    Syed Barnett: Wound Check (Right foot on bottom of foot at the heel started as a callous now its sore /Left foot foot is starting a sore )  Ulcers clean with Hibclens and then apply ointment cover with gauze. Consult Podiatry   He is going to get his labs at [facility]   Diagnosis and orders for thisvisit are above. All patient questions answered. Pt voiced understanding. Reviewedhealth maintenance. Instructed to continue current medications, diet and exercise. Patient agreed with treatment plan. Follow up as directed.

## 2022-08-09 NOTE — LETTER
[facility]  [address] [address]  Phone: [phone]  Fax: [phone]    SADIE Cain        August 9, 2022     Patient: Galilea Carvajal   YOB: 1985   Date of Visit: 8/9/2022       To Whom It May Concern: It is my medical opinion that Galilea Carvajal may return to work on 4/10/2022. If you have any questions or concerns, please don't hesitate to call.     Sincerely,        SADIE Cain

## 2023-08-15 ENCOUNTER — OFFICE VISIT (OUTPATIENT)
Dept: FAMILY MEDICINE CLINIC | Age: 38
End: 2023-08-15
Payer: COMMERCIAL

## 2023-08-15 VITALS
DIASTOLIC BLOOD PRESSURE: 90 MMHG | WEIGHT: 256 LBS | BODY MASS INDEX: 37.92 KG/M2 | OXYGEN SATURATION: 99 % | HEIGHT: 69 IN | SYSTOLIC BLOOD PRESSURE: 132 MMHG | HEART RATE: 83 BPM | TEMPERATURE: 97.4 F

## 2023-08-15 DIAGNOSIS — E10.628 TYPE 1 DIABETES MELLITUS WITH OTHER SKIN COMPLICATION (HCC): ICD-10-CM

## 2023-08-15 DIAGNOSIS — E10.3593 TYPE 1 DIABETES MELLITUS WITH PROLIFERATIVE RETINOPATHY OF BOTH EYES, MACULAR EDEMA PRESENCE UNSPECIFIED, UNSPECIFIED PROLIFERATIVE RETINOPATHY TYPE (HCC): ICD-10-CM

## 2023-08-15 DIAGNOSIS — E78.00 HYPERCHOLESTEREMIA: ICD-10-CM

## 2023-08-15 DIAGNOSIS — E03.9 HYPOTHYROIDISM, UNSPECIFIED TYPE: Primary | ICD-10-CM

## 2023-08-15 PROCEDURE — 99214 OFFICE O/P EST MOD 30 MIN: CPT | Performed by: NURSE PRACTITIONER

## 2023-08-15 RX ORDER — LISINOPRIL 5 MG/1
2.5 TABLET ORAL DAILY
Qty: 45 TABLET | Refills: 1 | Status: SHIPPED | OUTPATIENT
Start: 2023-08-15

## 2023-08-15 RX ORDER — INSULIN DEGLUDEC 200 U/ML
75 INJECTION, SOLUTION SUBCUTANEOUS NIGHTLY
Qty: 6 ADJUSTABLE DOSE PRE-FILLED PEN SYRINGE | Refills: 5 | Status: SHIPPED | OUTPATIENT
Start: 2023-08-15

## 2023-08-15 RX ORDER — ROSUVASTATIN CALCIUM 5 MG/1
5 TABLET, COATED ORAL DAILY
Qty: 90 TABLET | Refills: 1 | Status: SHIPPED | OUTPATIENT
Start: 2023-08-15

## 2023-08-15 RX ORDER — INSULIN LISPRO 100 [IU]/ML
80 INJECTION, SOLUTION INTRAVENOUS; SUBCUTANEOUS
Qty: 4 ADJUSTABLE DOSE PRE-FILLED PEN SYRINGE | Refills: 5 | Status: SHIPPED | OUTPATIENT
Start: 2023-08-15

## 2023-08-15 ASSESSMENT — ENCOUNTER SYMPTOMS
RESPIRATORY NEGATIVE: 1
GASTROINTESTINAL NEGATIVE: 1
PHOTOPHOBIA: 0

## 2023-08-15 ASSESSMENT — PATIENT HEALTH QUESTIONNAIRE - PHQ9
1. LITTLE INTEREST OR PLEASURE IN DOING THINGS: 0
SUM OF ALL RESPONSES TO PHQ QUESTIONS 1-9: 0
9. THOUGHTS THAT YOU WOULD BE BETTER OFF DEAD, OR OF HURTING YOURSELF: 0
2. FEELING DOWN, DEPRESSED OR HOPELESS: 0
6. FEELING BAD ABOUT YOURSELF - OR THAT YOU ARE A FAILURE OR HAVE LET YOURSELF OR YOUR FAMILY DOWN: 0
SUM OF ALL RESPONSES TO PHQ9 QUESTIONS 1 & 2: 0
SUM OF ALL RESPONSES TO PHQ QUESTIONS 1-9: 0
7. TROUBLE CONCENTRATING ON THINGS, SUCH AS READING THE NEWSPAPER OR WATCHING TELEVISION: 0
8. MOVING OR SPEAKING SO SLOWLY THAT OTHER PEOPLE COULD HAVE NOTICED. OR THE OPPOSITE, BEING SO FIGETY OR RESTLESS THAT YOU HAVE BEEN MOVING AROUND A LOT MORE THAN USUAL: 0
SUM OF ALL RESPONSES TO PHQ QUESTIONS 1-9: 0
5. POOR APPETITE OR OVEREATING: 0
10. IF YOU CHECKED OFF ANY PROBLEMS, HOW DIFFICULT HAVE THESE PROBLEMS MADE IT FOR YOU TO DO YOUR WORK, TAKE CARE OF THINGS AT HOME, OR GET ALONG WITH OTHER PEOPLE: 0
4. FEELING TIRED OR HAVING LITTLE ENERGY: 0
3. TROUBLE FALLING OR STAYING ASLEEP: 0
SUM OF ALL RESPONSES TO PHQ QUESTIONS 1-9: 0

## 2023-08-15 NOTE — PROGRESS NOTES
SUBJECTIVE:  Needing med refill DM  Patient ID: Faheem Michel is a 44 y.o. male. HPI:   Medication Refill  Associated symptoms include arthralgias and numbness. Pertinent negatives include no fatigue, headaches or myalgias. DM: He is here today to follow-up on his diabetes needing med refills its been almost a year since he has been here for blood work for chronic trying ask him to do a little bit better about coming in every 6 months he is taking about 20 units of insulin at each meal and then sometimes an additional 1 and in the evening he does not know what his blood sugars are running he does not really check all he is also taking the [place] he is type I diabetic he is having the start of some neuropathy in his feet although he states that its not really too bad  He has a lot of arthritic pain but he said 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 and that has created some problems he also has some vision issues where he is getting injections in his eyes but he does not have any blind spots just decrease in vision in the history he has had some thyroid disorder he is not longer taking any medication he does not really feel like there is any issue there as far as feeling very excessive fatigue or constipated or anything along those lines blood pressures little elevated he says he has had a history of it being a little elevated in the past but never anything that is consistent    HTN Is a little elevated at home has had history of perolid elevated reading but nothing consistent. THyroid       Cholesterol     Past Medical History:   Diagnosis Date    Diabetes mellitus type 1 ([address])     Diabetic retinopathy ([address])     RIGHT EYE    Diabetic retinopathy ([address])       Prior to Visit Medications    Medication Sig Taking?  Authorizing Provider   Insulin Degludec (TRESIBA FLEXTOUCH) 200 UNIT/ML SOPN Inject 75 Units into the skin nightly Yes SADIE Kumar   insulin lispro, 1 Unit Dial,

## 2023-08-25 ENCOUNTER — OFFICE VISIT (OUTPATIENT)
Dept: FAMILY MEDICINE CLINIC | Age: 38
End: 2023-08-25
Payer: COMMERCIAL

## 2023-08-25 VITALS
SYSTOLIC BLOOD PRESSURE: 120 MMHG | HEART RATE: 91 BPM | BODY MASS INDEX: 38.69 KG/M2 | DIASTOLIC BLOOD PRESSURE: 82 MMHG | WEIGHT: 262 LBS | RESPIRATION RATE: 17 BRPM | TEMPERATURE: 97.9 F | OXYGEN SATURATION: 98 %

## 2023-08-25 DIAGNOSIS — T67.5XXA HEAT EXHAUSTION, INITIAL ENCOUNTER: ICD-10-CM

## 2023-08-25 DIAGNOSIS — E10.3593 TYPE 1 DIABETES MELLITUS WITH PROLIFERATIVE RETINOPATHY OF BOTH EYES, MACULAR EDEMA PRESENCE UNSPECIFIED, UNSPECIFIED PROLIFERATIVE RETINOPATHY TYPE (HCC): Primary | Chronic | ICD-10-CM

## 2023-08-25 PROCEDURE — 99214 OFFICE O/P EST MOD 30 MIN: CPT | Performed by: CLINICAL NURSE SPECIALIST

## 2023-08-25 PROCEDURE — 3046F HEMOGLOBIN A1C LEVEL >9.0%: CPT | Performed by: CLINICAL NURSE SPECIALIST

## 2023-08-25 ASSESSMENT — ENCOUNTER SYMPTOMS
WHEEZING: 0
SINUS PRESSURE: 0
DIARRHEA: 1
VOMITING: 0
RHINORRHEA: 0
CHEST TIGHTNESS: 0
FACIAL SWELLING: 0
EYE PAIN: 0
NAUSEA: 0
EYE DISCHARGE: 0
COUGH: 0
SHORTNESS OF BREATH: 0
SORE THROAT: 0

## 2023-08-25 ASSESSMENT — PATIENT HEALTH QUESTIONNAIRE - PHQ9
1. LITTLE INTEREST OR PLEASURE IN DOING THINGS: 0
SUM OF ALL RESPONSES TO PHQ QUESTIONS 1-9: 0
2. FEELING DOWN, DEPRESSED OR HOPELESS: 0
SUM OF ALL RESPONSES TO PHQ QUESTIONS 1-9: 0
SUM OF ALL RESPONSES TO PHQ QUESTIONS 1-9: 0
SUM OF ALL RESPONSES TO PHQ9 QUESTIONS 1 & 2: 0
SUM OF ALL RESPONSES TO PHQ QUESTIONS 1-9: 0

## 2023-08-25 NOTE — PROGRESS NOTES
SUBJECTIVE:  Stella Hirsch is a 44 y.o. who presents today for Other (Works at [employer] thinks he got too hot, was achy tired, headache, etc. This morning still doesn't feel good but not quite as bad)      HPI    Capo Nieto presents today for an acute visit. Patient reports getting really hot on Wednesday evening. He felt like he might pass out. Did not feel well that night. Next day with ongoing symptoms, little urine output. Developed headache. Now with only mild fatigue and headache. Intake good. Urine output has improved. He did start [address] [place] and crestor on Tuesday. Underlying type 1 DM with hyperglycemia noted. He has had 2 episodes of diarrhea since yesterday.     Past Medical History:   Diagnosis Date    Diabetes mellitus type 1 ([address])     Diabetic retinopathy ([address])     RIGHT EYE    Diabetic retinopathy ([address])      Past Surgical History:   Procedure Laterality Date    PILONIDAL CYST EXCISION      TONSILLECTOMY       Family History   Problem Relation Age of Onset    No Known Problems Mother     Colon Cancer Father     Cancer Maternal Grandfather     Heart Attack Maternal Grandfather     Cancer Paternal Grandfather     Heart Attack Paternal Grandfather      Social History     Tobacco Use    Smoking status: Never    Smokeless tobacco: Never   Substance Use Topics    Alcohol use: No     Current Outpatient Medications   Medication Sig Dispense Refill    dapagliflozin (FARXIGA) 5 MG tablet Take 1 tablet by mouth every morning 90 tablet 1    levothyroxine (SYNTHROID) 50 MCG tablet Take 1 tablet by mouth daily 30 tablet 5    Insulin Degludec (TRESIBA FLEXTOUCH) 200 UNIT/ML SOPN Inject 75 Units into the skin nightly 6 Adjustable Dose Pre-filled Pen Syringe 5    insulin lispro, 1 Unit Dial, (HUMALOG KWIKPEN) 100 UNIT/ML SOPN Inject 80 Units into the skin 4 times daily (with meals and nightly) 4 Adjustable Dose Pre-filled Pen Syringe 5    lisinopril (PRINIVIL;ZESTRIL) 5 MG tablet Take 0.5 tablets by mouth